# Patient Record
Sex: FEMALE | Race: WHITE | Employment: FULL TIME | URBAN - METROPOLITAN AREA
[De-identification: names, ages, dates, MRNs, and addresses within clinical notes are randomized per-mention and may not be internally consistent; named-entity substitution may affect disease eponyms.]

---

## 2017-06-21 ENCOUNTER — HOSPITAL ENCOUNTER (OUTPATIENT)
Dept: MAMMOGRAPHY | Age: 52
Discharge: HOME | End: 2017-06-21
Attending: FAMILY MEDICINE
Payer: COMMERCIAL

## 2017-06-21 DIAGNOSIS — Z12.31 VISIT FOR SCREENING MAMMOGRAM: ICD-10-CM

## 2017-06-21 PROCEDURE — 77067 SCR MAMMO BI INCL CAD: CPT

## 2018-01-02 ENCOUNTER — HOSPITAL ENCOUNTER (OUTPATIENT)
Dept: CT IMAGING | Age: 53
Discharge: HOME | End: 2018-01-02
Attending: FAMILY MEDICINE
Payer: COMMERCIAL

## 2018-01-02 DIAGNOSIS — J44.9 CHRONIC OBSTRUCTIVE PULMONARY DISEASE, UNSPECIFIED COPD TYPE (HCC): ICD-10-CM

## 2018-01-02 DIAGNOSIS — R05.3 PERSISTENT COUGH: ICD-10-CM

## 2018-01-02 PROCEDURE — 71250 CT THORAX DX C-: CPT

## 2018-01-02 NOTE — PROGRESS NOTES
There is emphysema mostly of the upper lungs and some compression of the lung tissue called atelectasis in the main treatment for that is just take deep breaths regularly    A low dose screening chest CT in 1 year is a consideration and of course quit smoking because of the emphysema

## 2018-03-09 ENCOUNTER — HOSPITAL ENCOUNTER (OUTPATIENT)
Dept: GENERAL RADIOLOGY | Age: 53
Discharge: HOME | End: 2018-03-09
Payer: COMMERCIAL

## 2018-03-09 DIAGNOSIS — M54.2 NECK PAIN: ICD-10-CM

## 2018-03-09 PROCEDURE — 72050 X-RAY EXAM NECK SPINE 4/5VWS: CPT

## 2018-03-23 ENCOUNTER — HOSPITAL ENCOUNTER (OUTPATIENT)
Dept: PHYSICAL THERAPY | Age: 53
Discharge: HOME | End: 2018-03-23
Payer: COMMERCIAL

## 2018-03-23 PROCEDURE — 97161 PT EVAL LOW COMPLEX 20 MIN: CPT

## 2018-03-23 PROCEDURE — 97140 MANUAL THERAPY 1/> REGIONS: CPT

## 2018-03-23 NOTE — THERAPY EVALUATION
David Riojas  : 1965  Primary: [employer]  Secondary:  [id number] at [facility]  [address] [facility], [address]  [phone]   [phone]         OUTPATIENT PHYSICAL THERAPY:Initial Assessment 3/23/2018    ICD-10: Treatment Diagnosis: Cervicalgia (M54.2)  Precautions/Allergies: Other food; Abilify [aripiprazole]; Augmentin [amoxicillin-pot clavulanate]; Fioricet [butalbital-acetaminophen-caff]; Levaquin [levofloxacin]; Lithium; Prednisone; Risperidone; Serzone [nefazodone]; Wellbutrin [bupropion hcl]; and Zoloft [sertraline]   Fall Risk Score: 0 (? 5 = High Risk)  MD Orders: evaluate and treat MEDICAL/REFERRING DIAGNOSIS:  Neck pain [M54.2]   DATE OF ONSET: 3 weeks ago  REFERRING PHYSICIAN: Mariya Maxwell MD  RETURN PHYSICIAN APPOINTMENT: 2018     ASSESSMENT (Date: 3/23/18): Ms. Nhi Hernandez presents to physical therapy with pain in the R side of her neck x 3 weeks. She demonstrated decreased ROM, decreased facet mobility, increased tissue tightness, and poor posture. She would benefit from skilled PT to decrease her pain and increase her mobility in order to decrease future movement related issues. PROBLEM LIST (Impacting functional limitations):  1. Decreased Strength  2. Decreased ADL/Functional Activities  3. Increased Pain  4. Decreased Activity Tolerance  5. Decreased Flexibility/Joint Mobility  6. Decreased [place] with Home Exercise Program INTERVENTIONS PLANNED:  1. Cold  2. Electrical Stimulation  3. Heat  4. Home Exercise Program (HEP)  5. Manual Therapy  6. Range of Motion (ROM)  7. Therapeutic Activites  8. Therapeutic Exercise/Strengthening   TREATMENT PLAN:  Effective Dates: 3/23/2018 TO 2018 (90 days). Frequency/Duration: 2 times a week for 90 Days  GOALS: (Goals have been discussed and agreed upon with patient.)   Discharge Goals: Time Frame: 90 days   1.  Patient will be independent with HEP.  2. Patient will have a decrease on the NDI to 10 indicating improved functional mobility. 3. Patient will report a decrease in pain to 2/10 in order to complete ADLs without difficulty. 4. Patient will demonstrate proper ergonomics for working at her computer in order decrease risk of reinjury. Rehabilitation Potential For Stated Goals: Good  Regarding Juan Camilo's therapy, I certify that the treatment plan above will be carried out by a therapist or under their direction. Thank you for this referral,  ARCHIE DueñasT, NCS     Referring Physician Signature: Keenan Diaz MD              Date                    HISTORY:   Patient Stated Goal:        To get rid of my pain  History of Present Injury/Illness (Reason for Referral):  Patient reports pain in her neck on the R. They did an x-ray and it showed arthritis. It feels like it keeps getting worse. She reports putting heat and ice on it. She works at a computer all day. She also has fibromyalgia. She has taken some muscle relaxers but it makes her over sleep. Sleeps on her L side, 1 pillow. Pain med help. Working makes her pain worse. Past Medical History/Comorbidities:   Ms. Eri Carreon  has a past medical history of Actinic keratitis; Allergic rhinitis (7/21/2016); Anxiety (7/21/2016); Arthralgia; Bacterial infection of skin (7/21/2016); Benign essential HTN (7/21/2016); Breast lump (6/3/13); Bronchitis; CAD (coronary artery disease) (7/21/2016); Cellulitis of left eyelid; Chest pain; Chronic pain (7/21/2016); Chronic pruritus (7/21/2016); COPD; COPD (chronic obstructive pulmonary disease) (HCC) (7/21/2016); CRP elevated; Decreased hearing, left (7/21/2016); Depressive disorder (7/21/2016); Diabetes ([facility] [address] ([id number]); Diabetes mellitus, type 2 ([facility] [address] (7/21/2016); Diarrhea; Elevated LFTs; Excessive sleepiness (7/21/2016);  Fatigue; Fibromyalgia; Gallstones; Ganglion cyst; Gastroenteritis; Gastrointestinal disorder; GERD (gastroesophageal reflux disease); Hand pain; Hidradenitis; Hot flashes; Hyperlipidemia (7/21/2016); Hypersomnolence (7/18/2016); Malaise and fatigue (7/21/2016); Menopause (7/21/2016); Nausea; Neurological disorder; Obesity (7/21/2016); KATHY (obstructive sleep apnea) (7/18/2016); Osteopenia (7/21/2016); Otitis externa, chronic (7/21/2016); Otitis media; Psychiatric disorder; Restless legs (7/18/2016); RLS (restless legs syndrome) (7/21/2016); Sinusitis; SOB (shortness of breath); Strain of thoracic region; Tension type headache; Tobacco user (7/18/2016); and Vitamin D deficiency. Ms. Eri Carreon  has a past surgical history that includes hx orthopaedic; hx heent; hx gyn; hx orthopaedic (2015); hx cholecystectomy; hx tonsillectomy; hx gyn; and hx gyn (1987). Social History/Living Environment:     lives with . Driving, independent with ADLs  Prior Level of Function/Work/Activity:  Works at a computer all day. Dominant Side:         RIGHT  Current Medications:    Current Outpatient Prescriptions:     carisoprodol (SOMA) 350 mg tablet, Take 0.5-1 Tabs by mouth nightly as needed for Muscle Spasm(s). Max Daily Amount: 350 mg., Disp: 30 Tab, Rfl: 0    colesevelam (WELCHOL) 625 mg tablet, 6 tablets at lunch, Disp: 180 Tab, Rfl: 11    sertraline (ZOLOFT) 50 mg tablet, Take 2 Tabs by mouth daily. Two tablets daily  Indications: ANXIETY WITH DEPRESSION, Disp: 180 Tab, Rfl: 3    lisinopril (PRINIVIL, ZESTRIL) 5 mg tablet, Take 1 Tab by mouth daily. To lower blood pressure, Disp: 90 Tab, Rfl: 3    estradiol (ESTRACE) 2 mg tablet, Take 1 Tab by mouth daily. Indications: VASOMOTOR SYMPTOMS ASSOCIATED WITH MENOPAUSE, Disp: 90 Tab, Rfl: 3    ezetimibe-simvastatin (VYTORIN) 10-20 mg per tablet, Take 1 Tab by mouth daily. To lower cholesterol, Disp: 90 Tab, Rfl: 3    pioglitazone (ACTOS) 30 mg tablet, Take 1 Tab by mouth daily.  Indications: type 2 diabetes mellitus, am, Disp: 90 Tab, Rfl: 3    omega-3 acid ethyl esters (LOVAZA) 1 gram capsule, Take 4 Caps by mouth daily (with breakfast). , Disp: 360 Cap, Rfl: 3    oxyCODONE-acetaminophen (PERCOCET 10)  mg per tablet, Take 1 Tab by mouth every six (6) hours as needed for Pain. Max Daily Amount: 4 Tabs. Indications: Pain, Disp: 120 Tab, Rfl: 0    azithromycin (ZITHROMAX) 250 mg tablet, Take two(2) the first day,  Then one per day for 4 more days. , Disp: 6 Tab, Rfl: 0    omeprazole (PRILOSEC) 40 mg capsule, Take 1 Cap by mouth daily. , Disp: 90 Cap, Rfl: 3    gentamicin (GARAMYCIN) 0.3 % ophthalmic solution, Administer 2 Drops to both eyes every four (4) hours. , Disp: 5 mL, Rfl: 0    albuterol (PROVENTIL VENTOLIN) 2.5 mg /3 mL (0.083 %) nebulizer solution, USE 1 VIAL VIA NEBULIZER EVERY 6 HOURS AS NEEDED FOR WHEEZING, Disp: , Rfl: 0    promethazine-codeine (PHENERGAN WITH CODEINE) 6.25-10 mg/5 mL syrup, Take 5 mL by mouth four (4) times daily as needed for Cough. Max Daily Amount: 20 mL., Disp: 240 mL, Rfl: 0    albuterol (PROVENTIL HFA, VENTOLIN HFA, PROAIR HFA) 90 mcg/actuation inhaler, Take 2 Puffs by inhalation every four (4) hours. , Disp: 1 Inhaler, Rfl: 0    ALPRAZolam (XANAX) 0.5 mg tablet, Take 0.5-1 Tabs by mouth three (3) times daily as needed for Anxiety. Max Daily Amount: 1.5 mg., Disp: 60 Tab, Rfl: 2    Armodafinil (NUVIGIL) 150 mg tab, 1.5 tablets daily  Indications: SLEEPINESS DUE TO OBSTRUCTIVE SLEEP APNEA, Disp: 45 Tab, Rfl: 5    diphenoxylate-atropine (LOMOTIL) 2.5-0.025 mg per tablet, Take 1-2 Tabs by mouth four (4) times daily as needed for Diarrhea. Max Daily Amount: 8 Tabs., Disp: 60 Tab, Rfl: 0    metoclopramide HCl (REGLAN) 10 mg tablet, Take 0.5-1 Tabs by mouth Before breakfast, lunch, dinner and at bedtime for 30 days.  For nausea, bloating and belching, Disp: 120 Tab, Rfl: 2    resorcinol-sulfur 2-8 % topical cream, Apply  to affected area two (2) times a day., Disp: 1 Tube, Rfl: 11    levocetirizine (XYZAL) 5 mg tablet, Take 1 Tab by mouth daily., Disp: 90 Tab, Rfl: 3    doxycycline (VIBRAMYCIN) 100 mg capsule, Take 1 Cap by mouth two (2) times a day., Disp: 60 Cap, Rfl: 5    DULoxetine (CYMBALTA) 30 mg capsule, Take 1 Cap by mouth daily. Indications: ANXIETY WITH DEPRESSION, Disp: 90 Cap, Rfl: 3    cholecalciferol, vitamin D3, 2,000 unit tab, Take 2,000 Units by mouth daily. , Disp: , Rfl:     clotrimazole-betamethasone (LOTRISONE) topical cream, Apply  to affected area three (3) times daily. , Disp: , Rfl:     aspirin 81 mg tablet, Take 81 mg by mouth. Last dose 7/15/12  Indications: am, Disp: , Rfl:      Date Last Reviewed:  3/23/2018   Number of Personal Factors/Comorbidities that affect the Plan of Care: 1-2: MODERATE COMPLEXITY   EXAMINATION:   Observation/Orthostatic Postural Assessment:          Rounded shoulders, forward head   Mental Status:          WFL  Palpation:          Decreased movement R facet joints C5-T4. Patient with palpation to occiput, R first rib mobilization, scalenes   Sensation:         WFL  Skin Integrity:          intact  Vision:          WFL  Balance:          Functional     R cervical rotation 80% with pain  L cervical rotation 100% with soreness  L sidebend 10% with pain  R sidebend 70% no pain   Extension full ROM with pain  Flexion no pain full ROM    Upper Extremity:         Strength grossly 4+/5  Functional Mobility:         Gait/Ambulation:  No significant deficits         Transfers:  independent        Bed Mobility:  Pain with mobility in neck and low back. independent        Stairs:  NT        Wheelchair:  NT              Body Structures Involved:  1. Nerves  2. Bones  3. Joints  4. Muscles  5. Ligaments Body Functions Affected:  1. Sensory/Pain  2. Neuromusculoskeletal  3. Movement Related Activities and Participation Affected:  1. Mobility  2. Domestic Life  3. Interpersonal Interactions and Relationships  4.  Community, Social and [place]   Number of elements that affect the Plan of Care: 4+: HIGH COMPLEXITY   CLINICAL PRESENTATION:   Presentation: Stable and uncomplicated: LOW COMPLEXITY   CLINICAL DECISION MAKING:   Outcome Measure: Tool Used: Neck Disability Index (NDI)  Score:  Initial: 22/50  Most Recent: X/50 (Date: -- )   Interpretation of Score: The Neck Disability Index is a revised form of the Oswestry Low Back Pain Index and is designed to measure the activities of daily living in person's with neck pain. Each section is scored on a 0-5 scale, 5 representing the greatest disability. The scores of each section are added together for a total score of 50. Medical Necessity:   · Patient is expected to demonstrate progress in range of motion and functional technique to decrease pain and compensatory movements with ADLs and IADL. · Patient demonstrates good rehab potential due to higher previous functional level. Reason for Services/Other Comments:  · Patient continues to require modification of therapeutic interventions to increase complexity of exercises. Use of outcome tool(s) and clinical judgement create a POC that gives a: Questionable prediction of patient's progress: MODERATE COMPLEXITY   TREATMENT:   (In addition to Assessment/Re-Assessment sessions the following treatments were rendered)  Pre Treatment Symptoms: see above    Manual therapy: 10 minutes. Patient supine with wedge, soft tissue mobilization to R scalenes, upper trap, levator to decrease pain and improve tissue mobility. In sitting, AP joint mobs grade 2 to C5-T4 to improve spinal mobility and decrease pain. Ice x 8 minutes post treatment to decrease pain and soreness    Treatment/Session Assessment:  Patient reported a decrease in pain post treatment. Small increase in cervical mobility. · Pain/ Symptoms: Initial:   6/10 Post Session:  4/10 ·   Compliance with Program/Exercises: Will assess as treatment progresses. · Recommendations/Intent for next treatment session:  \"Next visit will focus on advancements to more challenging activities and reduction in assistance provided\".   Total Treatment Duration:  PT Patient Time In/Time Out  Time In: 1440  Time Out: [address], DPT

## 2018-03-26 ENCOUNTER — APPOINTMENT (OUTPATIENT)
Dept: PHYSICAL THERAPY | Age: 53
End: 2018-03-26
Payer: COMMERCIAL

## 2018-03-26 NOTE — PROGRESS NOTES
Ambulatory/Rehab Services H2 Model Falls Risk Assessment    Risk Factor Pts. ·   Confusion/Disorientation/Impulsivity  []    4 ·   Symptomatic Depression  []   2 ·   Altered Elimination  []   1 ·   Dizziness/Vertigo  []   1 ·   Gender (Male)  []   1 ·   Any administered antiepileptics (anticonvulsants):  []   2 ·   Any administered benzodiazepines:  []   1 ·   Visual Impairment (specify):  []   1 ·   Portable Oxygen Use  []   1 ·   Orthostatic ? BP  []   1 ·   History of Recent Falls (within 3 mos.)  []   5     Ability to Rise from Chair (choose one) Pts. ·   Ability to rise in a single movement  [x]   0 ·   Pushes up, successful in one attempt  []   1 ·   Multiple attempts, but successful  []   3 ·   Unable to rise without assistance  []   4   Total: (5 or greater = High Risk) 0     Falls Prevention Plan:   []                Physical Limitations to Exercise (specify):   []                Mobility Assistance Device (type):   []                Exercise/Equipment Adaptation (specify):    ©2010 [facility] [address] Patent #[id number].  Federal Law prohibits the replication, distribution or use without written permission from [facility]

## 2018-03-27 ENCOUNTER — HOSPITAL ENCOUNTER (OUTPATIENT)
Dept: PHYSICAL THERAPY | Age: 53
Discharge: HOME | End: 2018-03-27
Payer: COMMERCIAL

## 2018-03-27 PROCEDURE — 97140 MANUAL THERAPY 1/> REGIONS: CPT

## 2018-03-27 NOTE — PROGRESS NOTES
Rosey Diaz  : 1965  Primary: [facility]  Secondary:  [address] at [facility]  [address], [address] [place] [address], [address]  [phone]   [phone]         OUTPATIENT PHYSICAL THERAPY:Daily Note 3/27/2018    ICD-10: Treatment Diagnosis: Cervicalgia (M54.2)  Precautions/Allergies: Other food; Abilify [aripiprazole]; Augmentin [amoxicillin-pot clavulanate]; Fioricet [butalbital-acetaminophen-caff]; Levaquin [levofloxacin]; Lithium; Prednisone; Risperidone; Serzone [nefazodone]; Wellbutrin [bupropion hcl]; and Zoloft [sertraline]   Fall Risk Score: 0 (? 5 = High Risk)  MD Orders: evaluate and treat MEDICAL/REFERRING DIAGNOSIS:  Neck pain [M54.2]   DATE OF ONSET: 3 weeks ago  REFERRING PHYSICIAN: Hernandez Conklin MD  RETURN PHYSICIAN APPOINTMENT: 2018     ASSESSMENT (Date: 3/23/18): Ms. Kwame Grove presents to physical therapy with pain in the R side of her neck x 3 weeks. She demonstrated decreased ROM, decreased facet mobility, increased tissue tightness, and poor posture. She would benefit from skilled PT to decrease her pain and increase her mobility in order to decrease future movement related issues. PROBLEM LIST (Impacting functional limitations):  1. Decreased Strength  2. Decreased ADL/Functional Activities  3. Increased Pain  4. Decreased Activity Tolerance  5. Decreased Flexibility/Joint Mobility  6. Decreased [place] with Home Exercise Program INTERVENTIONS PLANNED:  1. Cold  2. Electrical Stimulation  3. Heat  4. Home Exercise Program (HEP)  5. Manual Therapy  6. Range of Motion (ROM)  7. Therapeutic Activites  8. Therapeutic Exercise/Strengthening   TREATMENT PLAN:  Effective Dates: 3/23/2018 TO 2018 (90 days). Frequency/Duration: 2 times a week for 90 Days  GOALS: (Goals have been discussed and agreed upon with patient.)   Discharge Goals: Time Frame: 90 days   1. Patient will be independent with HEP.   2. Patient will have a decrease on the NDI to 10 indicating improved functional mobility. 3. Patient will report a decrease in pain to 2/10 in order to complete ADLs without difficulty. 4. Patient will demonstrate proper ergonomics for working at her computer in order decrease risk of reinjury. Rehabilitation Potential For Stated Goals: Good  Regarding Kalyani Camilo's therapy, I certify that the treatment plan above will be carried out by a therapist or under their direction. Thank you for this referral,  Kaleb Grey PTA, NCS     Referring Physician Signature: Mohit Watts MD              Date                    HISTORY:   Patient Stated Goal:        To get rid of my pain  History of Present Injury/Illness (Reason for Referral):  Patient reports pain in her neck on the R. They did an x-ray and it showed arthritis. It feels like it keeps getting worse. She reports putting heat and ice on it. She works at a computer all day. She also has fibromyalgia. She has taken some muscle relaxers but it makes her over sleep. Sleeps on her L side, 1 pillow. Pain med help. Working makes her pain worse. Past Medical History/Comorbidities:   Ms. Jourdan Myrick  has a past medical history of Actinic keratitis; Allergic rhinitis (7/21/2016); Anxiety (7/21/2016); Arthralgia; Bacterial infection of skin (7/21/2016); Benign essential HTN (7/21/2016); Breast lump (6/3/13); Bronchitis; CAD (coronary artery disease) (7/21/2016); Cellulitis of left eyelid; Chest pain; Chronic pain (7/21/2016); Chronic pruritus (7/21/2016); COPD; COPD (chronic obstructive pulmonary disease) (HCC) (7/21/2016); CRP elevated; Decreased hearing, left (7/21/2016); Depressive disorder (7/21/2016); Diabetes ([facility] [address] ([id number]); Diabetes mellitus, type 2 ([facility] [address] (7/21/2016); Diarrhea; Elevated LFTs; Excessive sleepiness (7/21/2016);  Fatigue; Fibromyalgia; Gallstones; Ganglion cyst; Gastroenteritis; Gastrointestinal disorder; GERD (gastroesophageal reflux disease); Hand pain; Hidradenitis; Hot flashes; Hyperlipidemia (7/21/2016); Hypersomnolence (7/18/2016); Malaise and fatigue (7/21/2016); Menopause (7/21/2016); Nausea; Neurological disorder; Obesity (7/21/2016); KATHY (obstructive sleep apnea) (7/18/2016); Osteopenia (7/21/2016); Otitis externa, chronic (7/21/2016); Otitis media; Psychiatric disorder; Restless legs (7/18/2016); RLS (restless legs syndrome) (7/21/2016); Sinusitis; SOB (shortness of breath); Strain of thoracic region; Tension type headache; Tobacco user (7/18/2016); and Vitamin D deficiency. Ms. Kierra Theodore  has a past surgical history that includes hx orthopaedic; hx heent; hx gyn; hx orthopaedic (2015); hx cholecystectomy; hx tonsillectomy; hx gyn; and hx gyn (1987). Social History/Living Environment:     lives with . Driving, independent with ADLs  Prior Level of Function/Work/Activity:  Works at a computer all day. Dominant Side:         RIGHT  Current Medications:    Current Outpatient Prescriptions:     carisoprodol (SOMA) 350 mg tablet, Take 0.5-1 Tabs by mouth nightly as needed for Muscle Spasm(s). Max Daily Amount: 350 mg., Disp: 30 Tab, Rfl: 0    colesevelam (WELCHOL) 625 mg tablet, 6 tablets at lunch, Disp: 180 Tab, Rfl: 11    sertraline (ZOLOFT) 50 mg tablet, Take 2 Tabs by mouth daily. Two tablets daily  Indications: ANXIETY WITH DEPRESSION, Disp: 180 Tab, Rfl: 3    lisinopril (PRINIVIL, ZESTRIL) 5 mg tablet, Take 1 Tab by mouth daily. To lower blood pressure, Disp: 90 Tab, Rfl: 3    estradiol (ESTRACE) 2 mg tablet, Take 1 Tab by mouth daily. Indications: VASOMOTOR SYMPTOMS ASSOCIATED WITH MENOPAUSE, Disp: 90 Tab, Rfl: 3    ezetimibe-simvastatin (VYTORIN) 10-20 mg per tablet, Take 1 Tab by mouth daily. To lower cholesterol, Disp: 90 Tab, Rfl: 3    pioglitazone (ACTOS) 30 mg tablet, Take 1 Tab by mouth daily.  Indications: type 2 diabetes mellitus, am, Disp: 90 Tab, Rfl: 3    omega-3 acid ethyl esters (LOVAZA) 1 gram capsule, Take 4 Caps by mouth daily (with breakfast). , Disp: 360 Cap, Rfl: 3    oxyCODONE-acetaminophen (PERCOCET 10)  mg per tablet, Take 1 Tab by mouth every six (6) hours as needed for Pain. Max Daily Amount: 4 Tabs. Indications: Pain, Disp: 120 Tab, Rfl: 0    azithromycin (ZITHROMAX) 250 mg tablet, Take two(2) the first day,  Then one per day for 4 more days. , Disp: 6 Tab, Rfl: 0    omeprazole (PRILOSEC) 40 mg capsule, Take 1 Cap by mouth daily. , Disp: 90 Cap, Rfl: 3    gentamicin (GARAMYCIN) 0.3 % ophthalmic solution, Administer 2 Drops to both eyes every four (4) hours. , Disp: 5 mL, Rfl: 0    albuterol (PROVENTIL VENTOLIN) 2.5 mg /3 mL (0.083 %) nebulizer solution, USE 1 VIAL VIA NEBULIZER EVERY 6 HOURS AS NEEDED FOR WHEEZING, Disp: , Rfl: 0    promethazine-codeine (PHENERGAN WITH CODEINE) 6.25-10 mg/5 mL syrup, Take 5 mL by mouth four (4) times daily as needed for Cough. Max Daily Amount: 20 mL., Disp: 240 mL, Rfl: 0    albuterol (PROVENTIL HFA, VENTOLIN HFA, PROAIR HFA) 90 mcg/actuation inhaler, Take 2 Puffs by inhalation every four (4) hours. , Disp: 1 Inhaler, Rfl: 0    ALPRAZolam (XANAX) 0.5 mg tablet, Take 0.5-1 Tabs by mouth three (3) times daily as needed for Anxiety. Max Daily Amount: 1.5 mg., Disp: 60 Tab, Rfl: 2    Armodafinil (NUVIGIL) 150 mg tab, 1.5 tablets daily  Indications: SLEEPINESS DUE TO OBSTRUCTIVE SLEEP APNEA, Disp: 45 Tab, Rfl: 5    diphenoxylate-atropine (LOMOTIL) 2.5-0.025 mg per tablet, Take 1-2 Tabs by mouth four (4) times daily as needed for Diarrhea. Max Daily Amount: 8 Tabs., Disp: 60 Tab, Rfl: 0    metoclopramide HCl (REGLAN) 10 mg tablet, Take 0.5-1 Tabs by mouth Before breakfast, lunch, dinner and at bedtime for 30 days. For nausea, bloating and belching, Disp: 120 Tab, Rfl: 2    resorcinol-sulfur 2-8 % topical cream, Apply  to affected area two (2) times a day., Disp: 1 Tube, Rfl: 11    levocetirizine (XYZAL) 5 mg tablet, Take 1 Tab by mouth daily. , Disp: 90 Tab, Rfl: 3   doxycycline (VIBRAMYCIN) 100 mg capsule, Take 1 Cap by mouth two (2) times a day., Disp: 60 Cap, Rfl: 5    DULoxetine (CYMBALTA) 30 mg capsule, Take 1 Cap by mouth daily. Indications: ANXIETY WITH DEPRESSION, Disp: 90 Cap, Rfl: 3    cholecalciferol, vitamin D3, 2,000 unit tab, Take 2,000 Units by mouth daily. , Disp: , Rfl:     clotrimazole-betamethasone (LOTRISONE) topical cream, Apply  to affected area three (3) times daily. , Disp: , Rfl:     aspirin 81 mg tablet, Take 81 mg by mouth. Last dose 7/15/12  Indications: am, Disp: , Rfl:      Date Last Reviewed:  3/27/2018   Number of Personal Factors/Comorbidities that affect the Plan of Care: 1-2: MODERATE COMPLEXITY   EXAMINATION:   Observation/Orthostatic Postural Assessment:          Rounded shoulders, forward head   Mental Status:          WFL  Palpation:          Decreased movement R facet joints C5-T4. Patient with palpation to occiput, R first rib mobilization, scalenes   Sensation:         WFL  Skin Integrity:          intact  Vision:          WFL  Balance:          Functional     R cervical rotation 80% with pain  L cervical rotation 100% with soreness  L sidebend 10% with pain  R sidebend 70% no pain   Extension full ROM with pain  Flexion no pain full ROM    Upper Extremity:         Strength grossly 4+/5  Functional Mobility:         Gait/Ambulation:  No significant deficits         Transfers:  independent        Bed Mobility:  Pain with mobility in neck and low back. independent        Stairs:  NT        Wheelchair:  NT              Body Structures Involved:  1. Nerves  2. Bones  3. Joints  4. Muscles  5. Ligaments Body Functions Affected:  1. Sensory/Pain  2. Neuromusculoskeletal  3. Movement Related Activities and Participation Affected:  1. Mobility  2. Domestic Life  3. Interpersonal Interactions and Relationships  4.  Community, Social and [place]   Number of elements that affect the Plan of Care: 4+: HIGH COMPLEXITY   CLINICAL PRESENTATION: Presentation: Stable and uncomplicated: LOW COMPLEXITY   CLINICAL DECISION MAKING:   Outcome Measure: Tool Used: Neck Disability Index (NDI)  Score:  Initial: 22/50  Most Recent: X/50 (Date: -- )   Interpretation of Score: The Neck Disability Index is a revised form of the Oswestry Low Back Pain Index and is designed to measure the activities of daily living in person's with neck pain. Each section is scored on a 0-5 scale, 5 representing the greatest disability. The scores of each section are added together for a total score of 50. Medical Necessity:   · Patient is expected to demonstrate progress in range of motion and functional technique to decrease pain and compensatory movements with ADLs and IADL. · Patient demonstrates good rehab potential due to higher previous functional level. Reason for Services/Other Comments:  · Patient continues to require modification of therapeutic interventions to increase complexity of exercises. Use of outcome tool(s) and clinical judgement create a POC that gives a: Questionable prediction of patient's progress: MODERATE COMPLEXITY   TREATMENT:   (In addition to Assessment/Re-Assessment sessions the following treatments were rendered)  Pre Treatment Symptoms: Patient reports pain level ius 4/10 today after taking Oxycodone. Prior to pain medication pain level was 8/10. Patient states she uses heat at home to help control pain. Patient reports she sits at computer working all day. Mother is going to Chemo treatments and she is taking her and staying with her some right now. Modalities: (20 minutes total) patient supine with knee wedge in place for moist heat to neck and back for 10 minutes prior to manual to help decrease tightness and pain. Following manual ice pack to neck with patient supine and knee wedge in place to help decrease pain.        Manual therapy:   (25 minutes) patient sitting for STM to bilateral upper traps, upper back and neck to help decrease pain and tightness     Therapeutic Exercise: ( 0):  Exercises per grid below to improve mobility and strength. Required minimal visual and verbal cues to promote proper body alignment, promote proper body posture and promote proper body breathing techniques. Progressed resistance, range, repetitions and complexity of movement as indicated. Date:   Date:   Date:     Activity/Exercise Parameters Parameters Parameters                                                 Blood pressure in supine following treatment: 135/88 and heart rate 101bpm Patient states her heart rate stays high most of the time. Treatment/Session Assessment:  Patient reported a decreased pain and slightly improved mobility. Continue per plan of care. · Pain/ Symptoms: Initial:   4/10 after pain medication earlier Post Session: 2-3 /10 ·   Compliance with Program/Exercises: Will assess as treatment progresses. · Recommendations/Intent for next treatment session: \"Next visit will focus on advancements to more challenging activities and reduction in assistance provided\".   Total Treatment Duration:  PT Patient Time In/Time Out  Time In: 1345  Time Out: Hi Hoffman, PTA

## 2018-03-29 ENCOUNTER — HOSPITAL ENCOUNTER (OUTPATIENT)
Dept: PHYSICAL THERAPY | Age: 53
Discharge: HOME | End: 2018-03-29
Payer: COMMERCIAL

## 2018-03-29 PROCEDURE — 97140 MANUAL THERAPY 1/> REGIONS: CPT

## 2018-03-29 NOTE — PROGRESS NOTES
Cameron Vital  : 1965  Primary: Nba Lincolnakin Montgomery  Secondary:  Sonia Coulter at [facility] [address], [address] [facility], [address]  [phone]   [phone]         OUTPATIENT PHYSICAL THERAPY:Daily Note 3/29/2018    ICD-10: Treatment Diagnosis: Cervicalgia (M54.2)  Precautions/Allergies: Other food; Abilify [aripiprazole]; Augmentin [amoxicillin-pot clavulanate]; Fioricet [butalbital-acetaminophen-caff]; Levaquin [levofloxacin]; Lithium; Prednisone; Risperidone; Serzone [nefazodone]; Wellbutrin [bupropion hcl]; and Zoloft [sertraline]   Fall Risk Score: 0 (? 5 = High Risk)  MD Orders: evaluate and treat MEDICAL/REFERRING DIAGNOSIS:  Neck pain [M54.2]   DATE OF ONSET: 3 weeks ago  REFERRING PHYSICIAN: Ellen Bearden MD  RETURN PHYSICIAN APPOINTMENT: 2018     ASSESSMENT (Date: 3/23/18): Ms. Murlean Blizzard presents to physical therapy with pain in the R side of her neck x 3 weeks. She demonstrated decreased ROM, decreased facet mobility, increased tissue tightness, and poor posture. She would benefit from skilled PT to decrease her pain and increase her mobility in order to decrease future movement related issues. PROBLEM LIST (Impacting functional limitations):  1. Decreased Strength  2. Decreased ADL/Functional Activities  3. Increased Pain  4. Decreased Activity Tolerance  5. Decreased Flexibility/Joint Mobility  6. Decreased [place] with Home Exercise Program INTERVENTIONS PLANNED:  1. Cold  2. Electrical Stimulation  3. Heat  4. Home Exercise Program (HEP)  5. Manual Therapy  6. Range of Motion (ROM)  7. Therapeutic Activites  8. Therapeutic Exercise/Strengthening   TREATMENT PLAN:  Effective Dates: 3/23/2018 TO 2018 (90 days). Frequency/Duration: 2 times a week for 90 Days  GOALS: (Goals have been discussed and agreed upon with patient.)   Discharge Goals: Time Frame: 90 days   1. Patient will be independent with HEP.   2. Patient will have a decrease on the NDI to 10 indicating improved functional mobility. 3. Patient will report a decrease in pain to 2/10 in order to complete ADLs without difficulty. 4. Patient will demonstrate proper ergonomics for working at her computer in order decrease risk of reinjury. Rehabilitation Potential For Stated Goals: Good  Regarding Shamir Camilo's therapy, I certify that the treatment plan above will be carried out by a therapist or under their direction. Thank you for this referral,  Corie Gonzalez PTA, NCS     Referring Physician Signature: Adolfo Zhang MD              Date                    HISTORY:   Patient Stated Goal:        To get rid of my pain  History of Present Injury/Illness (Reason for Referral):  Patient reports pain in her neck on the R. They did an x-ray and it showed arthritis. It feels like it keeps getting worse. She reports putting heat and ice on it. She works at a computer all day. She also has fibromyalgia. She has taken some muscle relaxers but it makes her over sleep. Sleeps on her L side, 1 pillow. Pain med help. Working makes her pain worse. Past Medical History/Comorbidities:   Ms. Eduardo Moore  has a past medical history of Actinic keratitis; Allergic rhinitis (7/21/2016); Anxiety (7/21/2016); Arthralgia; Bacterial infection of skin (7/21/2016); Benign essential HTN (7/21/2016); Breast lump (6/3/13); Bronchitis; CAD (coronary artery disease) (7/21/2016); Cellulitis of left eyelid; Chest pain; Chronic pain (7/21/2016); Chronic pruritus (7/21/2016); COPD; COPD (chronic obstructive pulmonary disease) (HCC) (7/21/2016); CRP elevated; Decreased hearing, left (7/21/2016); Depressive disorder (7/21/2016); Diabetes ([facility] [address] ([id number]); Diabetes mellitus, type 2 ([facility] [address] (7/21/2016); Diarrhea; Elevated LFTs; Excessive sleepiness (7/21/2016);  Fatigue; Fibromyalgia; Gallstones; Ganglion cyst; Gastroenteritis; Gastrointestinal disorder; GERD (gastroesophageal reflux disease); Hand pain; Hidradenitis; Hot flashes; Hyperlipidemia (7/21/2016); Hypersomnolence (7/18/2016); Malaise and fatigue (7/21/2016); Menopause (7/21/2016); Nausea; Neurological disorder; Obesity (7/21/2016); KATHY (obstructive sleep apnea) (7/18/2016); Osteopenia (7/21/2016); Otitis externa, chronic (7/21/2016); Otitis media; Psychiatric disorder; Restless legs (7/18/2016); RLS (restless legs syndrome) (7/21/2016); Sinusitis; SOB (shortness of breath); Strain of thoracic region; Tension type headache; Tobacco user (7/18/2016); and Vitamin D deficiency. Ms. Shelia Lawrence  has a past surgical history that includes hx orthopaedic; hx heent; hx gyn; hx orthopaedic (2015); hx cholecystectomy; hx tonsillectomy; hx gyn; and hx gyn (1987). Social History/Living Environment:     lives with . Driving, independent with ADLs  Prior Level of Function/Work/Activity:  Works at a computer all day. Dominant Side:         RIGHT  Current Medications:    Current Outpatient Prescriptions:     carisoprodol (SOMA) 350 mg tablet, Take 0.5-1 Tabs by mouth nightly as needed for Muscle Spasm(s). Max Daily Amount: 350 mg., Disp: 30 Tab, Rfl: 0    colesevelam (WELCHOL) 625 mg tablet, 6 tablets at lunch, Disp: 180 Tab, Rfl: 11    sertraline (ZOLOFT) 50 mg tablet, Take 2 Tabs by mouth daily. Two tablets daily  Indications: ANXIETY WITH DEPRESSION, Disp: 180 Tab, Rfl: 3    lisinopril (PRINIVIL, ZESTRIL) 5 mg tablet, Take 1 Tab by mouth daily. To lower blood pressure, Disp: 90 Tab, Rfl: 3    estradiol (ESTRACE) 2 mg tablet, Take 1 Tab by mouth daily. Indications: VASOMOTOR SYMPTOMS ASSOCIATED WITH MENOPAUSE, Disp: 90 Tab, Rfl: 3    ezetimibe-simvastatin (VYTORIN) 10-20 mg per tablet, Take 1 Tab by mouth daily. To lower cholesterol, Disp: 90 Tab, Rfl: 3    pioglitazone (ACTOS) 30 mg tablet, Take 1 Tab by mouth daily.  Indications: type 2 diabetes mellitus, am, Disp: 90 Tab, Rfl: 3    omega-3 acid ethyl esters (LOVAZA) 1 gram capsule, Take 4 Caps by mouth daily (with breakfast). , Disp: 360 Cap, Rfl: 3    oxyCODONE-acetaminophen (PERCOCET 10)  mg per tablet, Take 1 Tab by mouth every six (6) hours as needed for Pain. Max Daily Amount: 4 Tabs. Indications: Pain, Disp: 120 Tab, Rfl: 0    azithromycin (ZITHROMAX) 250 mg tablet, Take two(2) the first day,  Then one per day for 4 more days. , Disp: 6 Tab, Rfl: 0    omeprazole (PRILOSEC) 40 mg capsule, Take 1 Cap by mouth daily. , Disp: 90 Cap, Rfl: 3    gentamicin (GARAMYCIN) 0.3 % ophthalmic solution, Administer 2 Drops to both eyes every four (4) hours. , Disp: 5 mL, Rfl: 0    albuterol (PROVENTIL VENTOLIN) 2.5 mg /3 mL (0.083 %) nebulizer solution, USE 1 VIAL VIA NEBULIZER EVERY 6 HOURS AS NEEDED FOR WHEEZING, Disp: , Rfl: 0    promethazine-codeine (PHENERGAN WITH CODEINE) 6.25-10 mg/5 mL syrup, Take 5 mL by mouth four (4) times daily as needed for Cough. Max Daily Amount: 20 mL., Disp: 240 mL, Rfl: 0    albuterol (PROVENTIL HFA, VENTOLIN HFA, PROAIR HFA) 90 mcg/actuation inhaler, Take 2 Puffs by inhalation every four (4) hours. , Disp: 1 Inhaler, Rfl: 0    ALPRAZolam (XANAX) 0.5 mg tablet, Take 0.5-1 Tabs by mouth three (3) times daily as needed for Anxiety. Max Daily Amount: 1.5 mg., Disp: 60 Tab, Rfl: 2    Armodafinil (NUVIGIL) 150 mg tab, 1.5 tablets daily  Indications: SLEEPINESS DUE TO OBSTRUCTIVE SLEEP APNEA, Disp: 45 Tab, Rfl: 5    diphenoxylate-atropine (LOMOTIL) 2.5-0.025 mg per tablet, Take 1-2 Tabs by mouth four (4) times daily as needed for Diarrhea. Max Daily Amount: 8 Tabs., Disp: 60 Tab, Rfl: 0    metoclopramide HCl (REGLAN) 10 mg tablet, Take 0.5-1 Tabs by mouth Before breakfast, lunch, dinner and at bedtime for 30 days. For nausea, bloating and belching, Disp: 120 Tab, Rfl: 2    resorcinol-sulfur 2-8 % topical cream, Apply  to affected area two (2) times a day., Disp: 1 Tube, Rfl: 11    levocetirizine (XYZAL) 5 mg tablet, Take 1 Tab by mouth daily. , Disp: 90 Tab, Rfl: 3   doxycycline (VIBRAMYCIN) 100 mg capsule, Take 1 Cap by mouth two (2) times a day., Disp: 60 Cap, Rfl: 5    DULoxetine (CYMBALTA) 30 mg capsule, Take 1 Cap by mouth daily. Indications: ANXIETY WITH DEPRESSION, Disp: 90 Cap, Rfl: 3    cholecalciferol, vitamin D3, 2,000 unit tab, Take 2,000 Units by mouth daily. , Disp: , Rfl:     clotrimazole-betamethasone (LOTRISONE) topical cream, Apply  to affected area three (3) times daily. , Disp: , Rfl:     aspirin 81 mg tablet, Take 81 mg by mouth. Last dose 7/15/12  Indications: am, Disp: , Rfl:      Date Last Reviewed:  3/29/2018   Number of Personal Factors/Comorbidities that affect the Plan of Care: 1-2: MODERATE COMPLEXITY   EXAMINATION:   Observation/Orthostatic Postural Assessment:          Rounded shoulders, forward head   Mental Status:          WFL  Palpation:          Decreased movement R facet joints C5-T4. Patient with palpation to occiput, R first rib mobilization, scalenes   Sensation:         WFL  Skin Integrity:          intact  Vision:          WFL  Balance:          Functional     R cervical rotation 80% with pain  L cervical rotation 100% with soreness  L sidebend 10% with pain  R sidebend 70% no pain   Extension full ROM with pain  Flexion no pain full ROM    Upper Extremity:         Strength grossly 4+/5  Functional Mobility:         Gait/Ambulation:  No significant deficits         Transfers:  independent        Bed Mobility:  Pain with mobility in neck and low back. independent        Stairs:  NT        Wheelchair:  NT              Body Structures Involved:  1. Nerves  2. Bones  3. Joints  4. Muscles  5. Ligaments Body Functions Affected:  1. Sensory/Pain  2. Neuromusculoskeletal  3. Movement Related Activities and Participation Affected:  1. Mobility  2. Domestic Life  3. Interpersonal Interactions and Relationships  4.  Community, Social and [place]   Number of elements that affect the Plan of Care: 4+: HIGH COMPLEXITY   CLINICAL PRESENTATION: Presentation: Stable and uncomplicated: LOW COMPLEXITY   CLINICAL DECISION MAKING:   Outcome Measure: Tool Used: Neck Disability Index (NDI)  Score:  Initial: 22/50  Most Recent: X/50 (Date: -- )   Interpretation of Score: The Neck Disability Index is a revised form of the Oswestry Low Back Pain Index and is designed to measure the activities of daily living in person's with neck pain. Each section is scored on a 0-5 scale, 5 representing the greatest disability. The scores of each section are added together for a total score of 50. Medical Necessity:   · Patient is expected to demonstrate progress in range of motion and functional technique to decrease pain and compensatory movements with ADLs and IADL. · Patient demonstrates good rehab potential due to higher previous functional level. Reason for Services/Other Comments:  · Patient continues to require modification of therapeutic interventions to increase complexity of exercises. Use of outcome tool(s) and clinical judgement create a POC that gives a: Questionable prediction of patient's progress: MODERATE COMPLEXITY   TREATMENT:   (In addition to Assessment/Re-Assessment sessions the following treatments were rendered)  Pre Treatment Symptoms: Patient reports that she is about the same. Pain level 5/10 today in neck and upper back. Modalities: (20 minutes total) patient supine with knee wedge in place for moist heat to neck and back for 10 minutes prior to manual to help decrease tightness and pain. Following manual ice pack to neck with patient supine and knee wedge in place to help decrease pain. Manual therapy:   (25 minutes) patient supine for STM to bilateral upper traps, upper back and neck and then patient sitting for STM to right more than left upper traps, upper back, and neck to help decrease pain and tightness. Therapeutic Exercise: ( 5 minutes):  Exercises per grid below to improve mobility and strength.   Required minimal visual and verbal cues to promote proper body alignment, promote proper body posture and promote proper body breathing techniques. Progressed resistance, range, repetitions and complexity of movement as indicated. Date:  3/29/18 Date:   Date:     Activity/Exercise Parameters Parameters Parameters   Scapular retraction X 10 reps       Shoulder shrugs X 10 reps  With discomfort in right neck area                                         Treatment/Session Assessment:  Patient reported a decreased pain and slightly improved mobility. Patient continues to have a good deal of pain and states that all she is doing at work is probably not helping. Work on more posture exercises next visit as patient tolerates. Continue per plan of care. · Pain/ Symptoms: Initial:   5/10 Post Session: 3 /10 ·   Compliance with Program/Exercises: Will assess as treatment progresses. · Recommendations/Intent for next treatment session: \"Next visit will focus on advancements to more challenging activities and reduction in assistance provided\".   Total Treatment Duration:  PT Patient Time In/Time Out  Time In: 1500  Time Out: [address], PTA

## 2018-04-02 ENCOUNTER — HOSPITAL ENCOUNTER (OUTPATIENT)
Dept: PHYSICAL THERAPY | Age: 53
Discharge: HOME | End: 2018-04-02
Payer: COMMERCIAL

## 2018-04-02 PROCEDURE — 97140 MANUAL THERAPY 1/> REGIONS: CPT

## 2018-04-02 NOTE — PROGRESS NOTES
Alex Goodwin  : 1965  Primary: Jerald Montgomery  Secondary:  [id number]  at [facility]  [address], [facility] [place], [address]  [phone]   [phone]         OUTPATIENT PHYSICAL THERAPY:Daily Note 2018    ICD-10: Treatment Diagnosis: Cervicalgia (M54.2)  Precautions/Allergies: Other food; Abilify [aripiprazole]; Augmentin [amoxicillin-pot clavulanate]; Fioricet [butalbital-acetaminophen-caff]; Levaquin [levofloxacin]; Lithium; Prednisone; Risperidone; Serzone [nefazodone]; Wellbutrin [bupropion hcl]; and Zoloft [sertraline]   Fall Risk Score: 0 (? 5 = High Risk)  MD Orders: evaluate and treat MEDICAL/REFERRING DIAGNOSIS:  Neck pain [M54.2]   DATE OF ONSET: 3 weeks ago  REFERRING PHYSICIAN: James Paulino MD  RETURN PHYSICIAN APPOINTMENT: 2018     ASSESSMENT (Date: 3/23/18): Ms. Esau Lopez presents to physical therapy with pain in the R side of her neck x 3 weeks. She demonstrated decreased ROM, decreased facet mobility, increased tissue tightness, and poor posture. She would benefit from skilled PT to decrease her pain and increase her mobility in order to decrease future movement related issues. PROBLEM LIST (Impacting functional limitations):  1. Decreased Strength  2. Decreased ADL/Functional Activities  3. Increased Pain  4. Decreased Activity Tolerance  5. Decreased Flexibility/Joint Mobility  6. Decreased [place] with Home Exercise Program INTERVENTIONS PLANNED:  1. Cold  2. Electrical Stimulation  3. Heat  4. Home Exercise Program (HEP)  5. Manual Therapy  6. Range of Motion (ROM)  7. Therapeutic Activites  8. Therapeutic Exercise/Strengthening   TREATMENT PLAN:  Effective Dates: 3/23/2018 TO 2018 (90 days). Frequency/Duration: 2 times a week for 90 Days  GOALS: (Goals have been discussed and agreed upon with patient.)   Discharge Goals: Time Frame: 90 days   1. Patient will be independent with HEP.   2. Patient will have a decrease on the NDI to 10 indicating improved functional mobility. 3. Patient will report a decrease in pain to 2/10 in order to complete ADLs without difficulty. 4. Patient will demonstrate proper ergonomics for working at her computer in order decrease risk of reinjury. Rehabilitation Potential For Stated Goals: Good  Regarding Genevieve Camilo's therapy, I certify that the treatment plan above will be carried out by a therapist or under their direction. Thank you for this referral,  Elif Palomares PTA, NCS     Referring Physician Signature: Mundo Arango MD              Date                    HISTORY:   Patient Stated Goal:        To get rid of my pain  History of Present Injury/Illness (Reason for Referral):  Patient reports pain in her neck on the R. They did an x-ray and it showed arthritis. It feels like it keeps getting worse. She reports putting heat and ice on it. She works at a computer all day. She also has fibromyalgia. She has taken some muscle relaxers but it makes her over sleep. Sleeps on her L side, 1 pillow. Pain med help. Working makes her pain worse. Past Medical History/Comorbidities:   Ms. Virginia Alvarez  has a past medical history of Actinic keratitis; Allergic rhinitis (7/21/2016); Anxiety (7/21/2016); Arthralgia; Bacterial infection of skin (7/21/2016); Benign essential HTN (7/21/2016); Breast lump (6/3/13); Bronchitis; CAD (coronary artery disease) (7/21/2016); Cellulitis of left eyelid; Chest pain; Chronic pain (7/21/2016); Chronic pruritus (7/21/2016); COPD; COPD (chronic obstructive pulmonary disease) (HCC) (7/21/2016); CRP elevated; Decreased hearing, left (7/21/2016); Depressive disorder (7/21/2016); Diabetes ([facility] [address] ([id number]); Diabetes mellitus, type 2 ([facility] [address] (7/21/2016); Diarrhea; Elevated LFTs; Excessive sleepiness (7/21/2016);  Fatigue; Fibromyalgia; Gallstones; Ganglion cyst; Gastroenteritis; Gastrointestinal disorder; GERD (gastroesophageal reflux disease); Hand pain; Hidradenitis; Hot flashes; Hyperlipidemia (7/21/2016); Hypersomnolence (7/18/2016); Malaise and fatigue (7/21/2016); Menopause (7/21/2016); Nausea; Neurological disorder; Obesity (7/21/2016); KATHY (obstructive sleep apnea) (7/18/2016); Osteopenia (7/21/2016); Otitis externa, chronic (7/21/2016); Otitis media; Psychiatric disorder; Restless legs (7/18/2016); RLS (restless legs syndrome) (7/21/2016); Sinusitis; SOB (shortness of breath); Strain of thoracic region; Tension type headache; Tobacco user (7/18/2016); and Vitamin D deficiency. Ms. Kaley Bianchi  has a past surgical history that includes hx orthopaedic; hx heent; hx gyn; hx orthopaedic (2015); hx cholecystectomy; hx tonsillectomy; hx gyn; and hx gyn (1987). Social History/Living Environment:     lives with . Driving, independent with ADLs  Prior Level of Function/Work/Activity:  Works at a computer all day. Dominant Side:         RIGHT  Current Medications:    Current Outpatient Prescriptions:     carisoprodol (SOMA) 350 mg tablet, Take 0.5-1 Tabs by mouth nightly as needed for Muscle Spasm(s). Max Daily Amount: 350 mg., Disp: 30 Tab, Rfl: 0    colesevelam (WELCHOL) 625 mg tablet, 6 tablets at lunch, Disp: 180 Tab, Rfl: 11    sertraline (ZOLOFT) 50 mg tablet, Take 2 Tabs by mouth daily. Two tablets daily  Indications: ANXIETY WITH DEPRESSION, Disp: 180 Tab, Rfl: 3    lisinopril (PRINIVIL, ZESTRIL) 5 mg tablet, Take 1 Tab by mouth daily. To lower blood pressure, Disp: 90 Tab, Rfl: 3    estradiol (ESTRACE) 2 mg tablet, Take 1 Tab by mouth daily. Indications: VASOMOTOR SYMPTOMS ASSOCIATED WITH MENOPAUSE, Disp: 90 Tab, Rfl: 3    ezetimibe-simvastatin (VYTORIN) 10-20 mg per tablet, Take 1 Tab by mouth daily. To lower cholesterol, Disp: 90 Tab, Rfl: 3    pioglitazone (ACTOS) 30 mg tablet, Take 1 Tab by mouth daily.  Indications: type 2 diabetes mellitus, am, Disp: 90 Tab, Rfl: 3    omega-3 acid ethyl esters (LOVAZA) 1 gram capsule, Take 4 Caps by mouth daily (with breakfast). , Disp: 360 Cap, Rfl: 3    oxyCODONE-acetaminophen (PERCOCET 10)  mg per tablet, Take 1 Tab by mouth every six (6) hours as needed for Pain. Max Daily Amount: 4 Tabs. Indications: Pain, Disp: 120 Tab, Rfl: 0    azithromycin (ZITHROMAX) 250 mg tablet, Take two(2) the first day,  Then one per day for 4 more days. , Disp: 6 Tab, Rfl: 0    omeprazole (PRILOSEC) 40 mg capsule, Take 1 Cap by mouth daily. , Disp: 90 Cap, Rfl: 3    gentamicin (GARAMYCIN) 0.3 % ophthalmic solution, Administer 2 Drops to both eyes every four (4) hours. , Disp: 5 mL, Rfl: 0    albuterol (PROVENTIL VENTOLIN) 2.5 mg /3 mL (0.083 %) nebulizer solution, USE 1 VIAL VIA NEBULIZER EVERY 6 HOURS AS NEEDED FOR WHEEZING, Disp: , Rfl: 0    promethazine-codeine (PHENERGAN WITH CODEINE) 6.25-10 mg/5 mL syrup, Take 5 mL by mouth four (4) times daily as needed for Cough. Max Daily Amount: 20 mL., Disp: 240 mL, Rfl: 0    albuterol (PROVENTIL HFA, VENTOLIN HFA, PROAIR HFA) 90 mcg/actuation inhaler, Take 2 Puffs by inhalation every four (4) hours. , Disp: 1 Inhaler, Rfl: 0    ALPRAZolam (XANAX) 0.5 mg tablet, Take 0.5-1 Tabs by mouth three (3) times daily as needed for Anxiety. Max Daily Amount: 1.5 mg., Disp: 60 Tab, Rfl: 2    Armodafinil (NUVIGIL) 150 mg tab, 1.5 tablets daily  Indications: SLEEPINESS DUE TO OBSTRUCTIVE SLEEP APNEA, Disp: 45 Tab, Rfl: 5    diphenoxylate-atropine (LOMOTIL) 2.5-0.025 mg per tablet, Take 1-2 Tabs by mouth four (4) times daily as needed for Diarrhea. Max Daily Amount: 8 Tabs., Disp: 60 Tab, Rfl: 0    metoclopramide HCl (REGLAN) 10 mg tablet, Take 0.5-1 Tabs by mouth Before breakfast, lunch, dinner and at bedtime for 30 days. For nausea, bloating and belching, Disp: 120 Tab, Rfl: 2    resorcinol-sulfur 2-8 % topical cream, Apply  to affected area two (2) times a day., Disp: 1 Tube, Rfl: 11    levocetirizine (XYZAL) 5 mg tablet, Take 1 Tab by mouth daily. , Disp: 90 Tab, Rfl: 3   doxycycline (VIBRAMYCIN) 100 mg capsule, Take 1 Cap by mouth two (2) times a day., Disp: 60 Cap, Rfl: 5    DULoxetine (CYMBALTA) 30 mg capsule, Take 1 Cap by mouth daily. Indications: ANXIETY WITH DEPRESSION, Disp: 90 Cap, Rfl: 3    cholecalciferol, vitamin D3, 2,000 unit tab, Take 2,000 Units by mouth daily. , Disp: , Rfl:     clotrimazole-betamethasone (LOTRISONE) topical cream, Apply  to affected area three (3) times daily. , Disp: , Rfl:     aspirin 81 mg tablet, Take 81 mg by mouth. Last dose 7/15/12  Indications: am, Disp: , Rfl:      Date Last Reviewed:  4/2/2018   Number of Personal Factors/Comorbidities that affect the Plan of Care: 1-2: MODERATE COMPLEXITY   EXAMINATION:   Observation/Orthostatic Postural Assessment:          Rounded shoulders, forward head   Mental Status:          WFL  Palpation:          Decreased movement R facet joints C5-T4. Patient with palpation to occiput, R first rib mobilization, scalenes   Sensation:         WFL  Skin Integrity:          intact  Vision:          WFL  Balance:          Functional     R cervical rotation 80% with pain  L cervical rotation 100% with soreness  L sidebend 10% with pain  R sidebend 70% no pain   Extension full ROM with pain  Flexion no pain full ROM    Upper Extremity:         Strength grossly 4+/5  Functional Mobility:         Gait/Ambulation:  No significant deficits         Transfers:  independent        Bed Mobility:  Pain with mobility in neck and low back. independent        Stairs:  NT        Wheelchair:  NT              Body Structures Involved:  1. Nerves  2. Bones  3. Joints  4. Muscles  5. Ligaments Body Functions Affected:  1. Sensory/Pain  2. Neuromusculoskeletal  3. Movement Related Activities and Participation Affected:  1. Mobility  2. Domestic Life  3. Interpersonal Interactions and Relationships  4.  Community, Social and [place]   Number of elements that affect the Plan of Care: 4+: HIGH COMPLEXITY   CLINICAL PRESENTATION: Presentation: Stable and uncomplicated: LOW COMPLEXITY   CLINICAL DECISION MAKING:   Outcome Measure: Tool Used: Neck Disability Index (NDI)  Score:  Initial: 22/50  Most Recent: X/50 (Date: -- )   Interpretation of Score: The Neck Disability Index is a revised form of the Oswestry Low Back Pain Index and is designed to measure the activities of daily living in person's with neck pain. Each section is scored on a 0-5 scale, 5 representing the greatest disability. The scores of each section are added together for a total score of 50. Medical Necessity:   · Patient is expected to demonstrate progress in range of motion and functional technique to decrease pain and compensatory movements with ADLs and IADL. · Patient demonstrates good rehab potential due to higher previous functional level. Reason for Services/Other Comments:  · Patient continues to require modification of therapeutic interventions to increase complexity of exercises. Use of outcome tool(s) and clinical judgement create a POC that gives a: Questionable prediction of patient's progress: MODERATE COMPLEXITY   TREATMENT:   (In addition to Assessment/Re-Assessment sessions the following treatments were rendered)  Pre Treatment Symptoms: Patient reports that she feels like she is getting a little better but reports pain level is 5/10 today. Patient states that her fibromyalgia has been bothering her more lately and on Saturday she could hardly get out of bed. Patient has difficulty sleeping due to pain/discomfort. Patient states it is very difficulty working but she loves her job and also needs to work. Modalities: (20 minutes total) patient supine with knee wedge in place for moist heat to neck and back for 10 minutes prior to manual to help decrease tightness and pain. Following manual ice pack to neck with patient supine and knee wedge in place to help decrease pain.        Manual therapy:   (25 minutes) patient sitting for STM to bilateral upper traps, upper back and neck and then patient supine with knee wedge in place  for STM to right more than left upper traps, upper back, and neck to help decrease pain and tightness. Therapeutic Exercise: ( 0 minutes):  Exercises per grid below to improve mobility and strength. Required minimal visual and verbal cues to promote proper body alignment, promote proper body posture and promote proper body breathing techniques. Progressed resistance, range, repetitions and complexity of movement as indicated. Date:  3/29/18 Date:   Date:     Activity/Exercise Parameters Parameters Parameters   Scapular retraction X 10 reps       Shoulder shrugs X 10 reps  With discomfort in right neck area                                         Treatment/Session Assessment:  Patient reports she feels more loose following treatment today. Pain remains about the same. Continue per plan of care. · Pain/ Symptoms: Initial:   5/10 Post Session: no number given ·   Compliance with Program/Exercises: Will assess as treatment progresses. · Recommendations/Intent for next treatment session: \"Next visit will focus on advancements to more challenging activities and reduction in assistance provided\".   Total Treatment Duration:  PT Patient Time In/Time Out  Time In: 1000  Time Out: [address], [place]

## 2018-04-06 ENCOUNTER — HOSPITAL ENCOUNTER (OUTPATIENT)
Dept: PHYSICAL THERAPY | Age: 53
Discharge: HOME | End: 2018-04-06
Payer: COMMERCIAL

## 2018-04-06 PROCEDURE — 97140 MANUAL THERAPY 1/> REGIONS: CPT

## 2018-04-06 NOTE — PROGRESS NOTES
Afshan Dubon  : 1965  Primary: Angelo Montgomery  Secondary:  [address] at [facility]  [address] [facility], [address] [place] [address], [address]  [phone]   [phone]         OUTPATIENT PHYSICAL THERAPY:Daily Note 2018    ICD-10: Treatment Diagnosis: Cervicalgia (M54.2)  Precautions/Allergies: Other food; Abilify [aripiprazole]; Augmentin [amoxicillin-pot clavulanate]; Fioricet [butalbital-acetaminophen-caff]; Levaquin [levofloxacin]; Lithium; Prednisone; Risperidone; Serzone [nefazodone]; Wellbutrin [bupropion hcl]; and Zoloft [sertraline]   Fall Risk Score: 0 (? 5 = High Risk)  MD Orders: evaluate and treat MEDICAL/REFERRING DIAGNOSIS:  Neck pain [M54.2]   DATE OF ONSET: 3 weeks ago  REFERRING PHYSICIAN: Angle Stoddard MD  RETURN PHYSICIAN APPOINTMENT: 2018     ASSESSMENT (Date: 3/23/18): Ms. Dorota Solitario presents to physical therapy with pain in the R side of her neck x 3 weeks. She demonstrated decreased ROM, decreased facet mobility, increased tissue tightness, and poor posture. She would benefit from skilled PT to decrease her pain and increase her mobility in order to decrease future movement related issues. PROBLEM LIST (Impacting functional limitations):  1. Decreased Strength  2. Decreased ADL/Functional Activities  3. Increased Pain  4. Decreased Activity Tolerance  5. Decreased Flexibility/Joint Mobility  6. Decreased [place] with Home Exercise Program INTERVENTIONS PLANNED:  1. Cold  2. Electrical Stimulation  3. Heat  4. Home Exercise Program (HEP)  5. Manual Therapy  6. Range of Motion (ROM)  7. Therapeutic Activites  8. Therapeutic Exercise/Strengthening   TREATMENT PLAN:  Effective Dates: 3/23/2018 TO 2018 (90 days). Frequency/Duration: 2 times a week for 90 Days  GOALS: (Goals have been discussed and agreed upon with patient.)   Discharge Goals: Time Frame: 90 days   1. Patient will be independent with HEP.   2. Patient will have a decrease on the NDI to 10 indicating improved functional mobility. 3. Patient will report a decrease in pain to 2/10 in order to complete ADLs without difficulty. 4. Patient will demonstrate proper ergonomics for working at her computer in order decrease risk of reinjury. Rehabilitation Potential For Stated Goals: Good  Regarding Sergo Camilo's therapy, I certify that the treatment plan above will be carried out by a therapist or under their direction. Thank you for this referral,  Danuta Rebollar PTA, NCS     Referring Physician Signature: Tavon Jc MD              Date                    HISTORY:   Patient Stated Goal:        To get rid of my pain  History of Present Injury/Illness (Reason for Referral):  Patient reports pain in her neck on the R. They did an x-ray and it showed arthritis. It feels like it keeps getting worse. She reports putting heat and ice on it. She works at a computer all day. She also has fibromyalgia. She has taken some muscle relaxers but it makes her over sleep. Sleeps on her L side, 1 pillow. Pain med help. Working makes her pain worse. Past Medical History/Comorbidities:   Ms. Louisa Fleming  has a past medical history of Actinic keratitis; Allergic rhinitis (7/21/2016); Anxiety (7/21/2016); Arthralgia; Bacterial infection of skin (7/21/2016); Benign essential HTN (7/21/2016); Breast lump (6/3/13); Bronchitis; CAD (coronary artery disease) (7/21/2016); Cellulitis of left eyelid; Chest pain; Chronic pain (7/21/2016); Chronic pruritus (7/21/2016); COPD; COPD (chronic obstructive pulmonary disease) (HCC) (7/21/2016); CRP elevated; Decreased hearing, left (7/21/2016); Depressive disorder (7/21/2016); Diabetes ([facility] [address] ([id number]); Diabetes mellitus, type 2 ([facility] [address] (7/21/2016); Diarrhea; Elevated LFTs; Excessive sleepiness (7/21/2016);  Fatigue; Fibromyalgia; Gallstones; Ganglion cyst; Gastroenteritis; Gastrointestinal disorder; GERD (gastroesophageal reflux disease); Hand pain; Hidradenitis; Hot flashes; Hyperlipidemia (7/21/2016); Hypersomnolence (7/18/2016); Malaise and fatigue (7/21/2016); Menopause (7/21/2016); Nausea; Neurological disorder; Obesity (7/21/2016); KATHY (obstructive sleep apnea) (7/18/2016); Osteopenia (7/21/2016); Otitis externa, chronic (7/21/2016); Otitis media; Psychiatric disorder; Restless legs (7/18/2016); RLS (restless legs syndrome) (7/21/2016); Sinusitis; SOB (shortness of breath); Strain of thoracic region; Tension type headache; Tobacco user (7/18/2016); and Vitamin D deficiency. Ms. Geovany Spangler  has a past surgical history that includes hx orthopaedic; hx heent; hx gyn; hx orthopaedic (2015); hx cholecystectomy; hx tonsillectomy; hx gyn; and hx gyn (1987). Social History/Living Environment:     lives with . Driving, independent with ADLs  Prior Level of Function/Work/Activity:  Works at a computer all day. Dominant Side:         RIGHT  Current Medications:    Current Outpatient Prescriptions:     carisoprodol (SOMA) 350 mg tablet, Take 0.5-1 Tabs by mouth nightly as needed for Muscle Spasm(s). Max Daily Amount: 350 mg., Disp: 30 Tab, Rfl: 0    colesevelam (WELCHOL) 625 mg tablet, 6 tablets at lunch, Disp: 180 Tab, Rfl: 11    sertraline (ZOLOFT) 50 mg tablet, Take 2 Tabs by mouth daily. Two tablets daily  Indications: ANXIETY WITH DEPRESSION, Disp: 180 Tab, Rfl: 3    lisinopril (PRINIVIL, ZESTRIL) 5 mg tablet, Take 1 Tab by mouth daily. To lower blood pressure, Disp: 90 Tab, Rfl: 3    estradiol (ESTRACE) 2 mg tablet, Take 1 Tab by mouth daily. Indications: VASOMOTOR SYMPTOMS ASSOCIATED WITH MENOPAUSE, Disp: 90 Tab, Rfl: 3    ezetimibe-simvastatin (VYTORIN) 10-20 mg per tablet, Take 1 Tab by mouth daily. To lower cholesterol, Disp: 90 Tab, Rfl: 3    pioglitazone (ACTOS) 30 mg tablet, Take 1 Tab by mouth daily.  Indications: type 2 diabetes mellitus, am, Disp: 90 Tab, Rfl: 3    omega-3 acid ethyl esters (LOVAZA) 1 gram capsule, Take 4 Caps by mouth daily (with breakfast). , Disp: 360 Cap, Rfl: 3    oxyCODONE-acetaminophen (PERCOCET 10)  mg per tablet, Take 1 Tab by mouth every six (6) hours as needed for Pain. Max Daily Amount: 4 Tabs. Indications: Pain, Disp: 120 Tab, Rfl: 0    azithromycin (ZITHROMAX) 250 mg tablet, Take two(2) the first day,  Then one per day for 4 more days. , Disp: 6 Tab, Rfl: 0    omeprazole (PRILOSEC) 40 mg capsule, Take 1 Cap by mouth daily. , Disp: 90 Cap, Rfl: 3    gentamicin (GARAMYCIN) 0.3 % ophthalmic solution, Administer 2 Drops to both eyes every four (4) hours. , Disp: 5 mL, Rfl: 0    albuterol (PROVENTIL VENTOLIN) 2.5 mg /3 mL (0.083 %) nebulizer solution, USE 1 VIAL VIA NEBULIZER EVERY 6 HOURS AS NEEDED FOR WHEEZING, Disp: , Rfl: 0    promethazine-codeine (PHENERGAN WITH CODEINE) 6.25-10 mg/5 mL syrup, Take 5 mL by mouth four (4) times daily as needed for Cough. Max Daily Amount: 20 mL., Disp: 240 mL, Rfl: 0    albuterol (PROVENTIL HFA, VENTOLIN HFA, PROAIR HFA) 90 mcg/actuation inhaler, Take 2 Puffs by inhalation every four (4) hours. , Disp: 1 Inhaler, Rfl: 0    ALPRAZolam (XANAX) 0.5 mg tablet, Take 0.5-1 Tabs by mouth three (3) times daily as needed for Anxiety. Max Daily Amount: 1.5 mg., Disp: 60 Tab, Rfl: 2    Armodafinil (NUVIGIL) 150 mg tab, 1.5 tablets daily  Indications: SLEEPINESS DUE TO OBSTRUCTIVE SLEEP APNEA, Disp: 45 Tab, Rfl: 5    diphenoxylate-atropine (LOMOTIL) 2.5-0.025 mg per tablet, Take 1-2 Tabs by mouth four (4) times daily as needed for Diarrhea. Max Daily Amount: 8 Tabs., Disp: 60 Tab, Rfl: 0    metoclopramide HCl (REGLAN) 10 mg tablet, Take 0.5-1 Tabs by mouth Before breakfast, lunch, dinner and at bedtime for 30 days. For nausea, bloating and belching, Disp: 120 Tab, Rfl: 2    resorcinol-sulfur 2-8 % topical cream, Apply  to affected area two (2) times a day., Disp: 1 Tube, Rfl: 11    levocetirizine (XYZAL) 5 mg tablet, Take 1 Tab by mouth daily. , Disp: 90 Tab, Rfl: 3   doxycycline (VIBRAMYCIN) 100 mg capsule, Take 1 Cap by mouth two (2) times a day., Disp: 60 Cap, Rfl: 5    DULoxetine (CYMBALTA) 30 mg capsule, Take 1 Cap by mouth daily. Indications: ANXIETY WITH DEPRESSION, Disp: 90 Cap, Rfl: 3    cholecalciferol, vitamin D3, 2,000 unit tab, Take 2,000 Units by mouth daily. , Disp: , Rfl:     clotrimazole-betamethasone (LOTRISONE) topical cream, Apply  to affected area three (3) times daily. , Disp: , Rfl:     aspirin 81 mg tablet, Take 81 mg by mouth. Last dose 7/15/12  Indications: am, Disp: , Rfl:      Date Last Reviewed:  4/6/2018   Number of Personal Factors/Comorbidities that affect the Plan of Care: 1-2: MODERATE COMPLEXITY   EXAMINATION:   Observation/Orthostatic Postural Assessment:          Rounded shoulders, forward head   Mental Status:          WFL  Palpation:          Decreased movement R facet joints C5-T4. Patient with palpation to occiput, R first rib mobilization, scalenes   Sensation:         WFL  Skin Integrity:          intact  Vision:          WFL  Balance:          Functional     R cervical rotation 80% with pain  L cervical rotation 100% with soreness  L sidebend 10% with pain  R sidebend 70% no pain   Extension full ROM with pain  Flexion no pain full ROM    Upper Extremity:         Strength grossly 4+/5  Functional Mobility:         Gait/Ambulation:  No significant deficits         Transfers:  independent        Bed Mobility:  Pain with mobility in neck and low back. independent        Stairs:  NT        Wheelchair:  NT              Body Structures Involved:  1. Nerves  2. Bones  3. Joints  4. Muscles  5. Ligaments Body Functions Affected:  1. Sensory/Pain  2. Neuromusculoskeletal  3. Movement Related Activities and Participation Affected:  1. Mobility  2. Domestic Life  3. Interpersonal Interactions and Relationships  4.  Community, Social and [place]   Number of elements that affect the Plan of Care: 4+: HIGH COMPLEXITY   CLINICAL PRESENTATION: Presentation: Stable and uncomplicated: LOW COMPLEXITY   CLINICAL DECISION MAKING:   Outcome Measure: Tool Used: Neck Disability Index (NDI)  Score:  Initial: 22/50  Most Recent: X/50 (Date: -- )   Interpretation of Score: The Neck Disability Index is a revised form of the Oswestry Low Back Pain Index and is designed to measure the activities of daily living in person's with neck pain. Each section is scored on a 0-5 scale, 5 representing the greatest disability. The scores of each section are added together for a total score of 50. Medical Necessity:   · Patient is expected to demonstrate progress in range of motion and functional technique to decrease pain and compensatory movements with ADLs and IADL. · Patient demonstrates good rehab potential due to higher previous functional level. Reason for Services/Other Comments:  · Patient continues to require modification of therapeutic interventions to increase complexity of exercises. Use of outcome tool(s) and clinical judgement create a POC that gives a: Questionable prediction of patient's progress: MODERATE COMPLEXITY   TREATMENT:   (In addition to Assessment/Re-Assessment sessions the following treatments were rendered)  Pre Treatment Symptoms: Patient reports her pain level is 6/10 and states that her neck hurts with cervical extension and begins to look up but moves only minimally and she states that it also hurts more with     Modalities: (20 minutes total) patient supine with knee wedge in place for moist heat to neck and back for 10 minutes prior to manual to help decrease tightness and pain. Following manual ice pack to neck with patient supine and knee wedge in place to help decrease pain. Manual therapy:   (25 minutes) patient sitting for STM to bilateral upper traps, upper back and neck with patient requesting to sit because she said she was uncomfortable lying down.      Therapeutic Exercise: ( 0 minutes):  Exercises per grid below to improve mobility and strength. Required minimal visual and verbal cues to promote proper body alignment, promote proper body posture and promote proper body breathing techniques. Progressed resistance, range, repetitions and complexity of movement as indicated. Date:  3/29/18 Date:   Date:     Activity/Exercise Parameters Parameters Parameters   Scapular retraction X 10 reps       Shoulder shrugs X 10 reps  With discomfort in right neck area                                         Treatment/Session Assessment:  Patient reports she feels more loose following treatment today and states she feels better after the treatments but by the next day pain returns. Continue per plan of care. · Pain/ Symptoms: Initial:   6/10 Post Session: better but did not give a number. ·   Compliance with Program/Exercises: Will assess as treatment progresses. · Recommendations/Intent for next treatment session: \"Next visit will focus on advancements to more challenging activities and reduction in assistance provided\".   Total Treatment Duration:  PT Patient Time In/Time Out  Time In: 1430  Time Out: [facility]

## 2018-04-09 ENCOUNTER — HOSPITAL ENCOUNTER (OUTPATIENT)
Dept: PHYSICAL THERAPY | Age: 53
Discharge: HOME | End: 2018-04-09
Payer: COMMERCIAL

## 2018-04-09 PROCEDURE — 97110 THERAPEUTIC EXERCISES: CPT

## 2018-04-09 PROCEDURE — 97140 MANUAL THERAPY 1/> REGIONS: CPT

## 2018-04-09 NOTE — PROGRESS NOTES
Joshua Serum  : 1965  Primary: [facility]  Secondary:  [address]  at [facility]  [address], [place] [address] [address] [facility]  [phone]   [phone]         OUTPATIENT PHYSICAL THERAPY:Progress Report 2018    ICD-10: Treatment Diagnosis: Cervicalgia (M54.2)  Precautions/Allergies: Other food; Abilify [aripiprazole]; Augmentin [amoxicillin-pot clavulanate]; Fioricet [butalbital-acetaminophen-caff]; Levaquin [levofloxacin]; Lithium; Prednisone; Risperidone; Serzone [nefazodone]; Wellbutrin [bupropion hcl]; and Zoloft [sertraline]   Fall Risk Score: 0 (? 5 = High Risk)  MD Orders: evaluate and treat MEDICAL/REFERRING DIAGNOSIS:  Neck pain [M54.2]   DATE OF ONSET: 3 weeks ago  REFERRING PHYSICIAN: Mauricio Rayo MD  RETURN PHYSICIAN APPOINTMENT: 2018     ASSESSMENT (Date: 18):  Ms. Hoa Delgado has been seen in physical therapy for 6 visits since 3/23/18 for neck pain. She has now decreased her pain to 2/10. She has also had a 7 point decrease on the neck disability index. She had demonstrated good progress towards goals adonay continues to benefit from skilled PT in order to return to her full PLOF. PROBLEM LIST (Impacting functional limitations):  1. Decreased Strength  2. Decreased ADL/Functional Activities  3. Increased Pain  4. Decreased Activity Tolerance  5. Decreased Flexibility/Joint Mobility  6. Decreased [place] with Home Exercise Program INTERVENTIONS PLANNED:  1. Cold  2. Electrical Stimulation  3. Heat  4. Home Exercise Program (HEP)  5. Manual Therapy  6. Range of Motion (ROM)  7. Therapeutic Activites  8. Therapeutic Exercise/Strengthening   TREATMENT PLAN:  Effective Dates: 3/23/2018 TO 2018 (90 days). Frequency/Duration: 2 times a week for 90 Days  GOALS: (Goals have been discussed and agreed upon with patient.)   Discharge Goals: Time Frame: 90 days   1. Patient will be independent with HEP.  NOT MET  2. Patient will have a decrease on the NDI to 10 indicating improved functional mobility. NOT MET  3. Patient will report a decrease in pain to 2/10 in order to complete ADLs without difficulty. MET  4. Patient will demonstrate proper ergonomics for working at her computer in order decrease risk of reinjury. MET  Rehabilitation Potential For Stated Goals: Good  Regarding Cristy Frame therapy, I certify that the treatment plan above will be carried out by a therapist or under their direction. Thank you for this referral,  Sharita Pagan, DPT, NCS                 HISTORY:   Patient Stated Goal:        To get rid of my pain  History of Present Injury/Illness (Reason for Referral):  Patient reports pain in her neck on the R. They did an x-ray and it showed arthritis. It feels like it keeps getting worse. She reports putting heat and ice on it. She works at a computer all day. She also has fibromyalgia. She has taken some muscle relaxers but it makes her over sleep. Sleeps on her L side, 1 pillow. Pain med help. Working makes her pain worse. Past Medical History/Comorbidities:   Ms. Angel Burns  has a past medical history of Actinic keratitis; Allergic rhinitis (7/21/2016); Anxiety (7/21/2016); Arthralgia; Bacterial infection of skin (7/21/2016); Benign essential HTN (7/21/2016); Breast lump (6/3/13); Bronchitis; CAD (coronary artery disease) (7/21/2016); Cellulitis of left eyelid; Chest pain; Chronic pain (7/21/2016); Chronic pruritus (7/21/2016); COPD; COPD (chronic obstructive pulmonary disease) (HCC) (7/21/2016); CRP elevated; Decreased hearing, left (7/21/2016); Depressive disorder (7/21/2016); Diabetes ([facility] [address] ([id number]); Diabetes mellitus, type 2 ([facility] [address] (7/21/2016); Diarrhea; Elevated LFTs; Excessive sleepiness (7/21/2016); Fatigue; Fibromyalgia; Gallstones; Ganglion cyst; Gastroenteritis; Gastrointestinal disorder; GERD (gastroesophageal reflux disease); Hand pain; Hidradenitis;  Hot flashes; Hyperlipidemia (7/21/2016); Hypersomnolence (7/18/2016); Malaise and fatigue (7/21/2016); Menopause (7/21/2016); Nausea; Neurological disorder; Obesity (7/21/2016); KATHY (obstructive sleep apnea) (7/18/2016); Osteopenia (7/21/2016); Otitis externa, chronic (7/21/2016); Otitis media; Psychiatric disorder; Restless legs (7/18/2016); RLS (restless legs syndrome) (7/21/2016); Sinusitis; SOB (shortness of breath); Strain of thoracic region; Tension type headache; Tobacco user (7/18/2016); and Vitamin D deficiency. Ms. Mariela Sims  has a past surgical history that includes hx orthopaedic; hx heent; hx gyn; hx orthopaedic (2015); hx cholecystectomy; hx tonsillectomy; hx gyn; and hx gyn (1987). Social History/Living Environment:     lives with . Driving, independent with ADLs  Prior Level of Function/Work/Activity:  Works at a computer all day. Dominant Side:         RIGHT  Current Medications:    Current Outpatient Prescriptions:     carisoprodol (SOMA) 350 mg tablet, Take 0.5-1 Tabs by mouth nightly as needed for Muscle Spasm(s). Max Daily Amount: 350 mg., Disp: 30 Tab, Rfl: 0    colesevelam (WELCHOL) 625 mg tablet, 6 tablets at lunch, Disp: 180 Tab, Rfl: 11    sertraline (ZOLOFT) 50 mg tablet, Take 2 Tabs by mouth daily. Two tablets daily  Indications: ANXIETY WITH DEPRESSION, Disp: 180 Tab, Rfl: 3    lisinopril (PRINIVIL, ZESTRIL) 5 mg tablet, Take 1 Tab by mouth daily. To lower blood pressure, Disp: 90 Tab, Rfl: 3    estradiol (ESTRACE) 2 mg tablet, Take 1 Tab by mouth daily. Indications: VASOMOTOR SYMPTOMS ASSOCIATED WITH MENOPAUSE, Disp: 90 Tab, Rfl: 3    ezetimibe-simvastatin (VYTORIN) 10-20 mg per tablet, Take 1 Tab by mouth daily. To lower cholesterol, Disp: 90 Tab, Rfl: 3    pioglitazone (ACTOS) 30 mg tablet, Take 1 Tab by mouth daily. Indications: type 2 diabetes mellitus, am, Disp: 90 Tab, Rfl: 3    omega-3 acid ethyl esters (LOVAZA) 1 gram capsule, Take 4 Caps by mouth daily (with breakfast). , Disp: 360 Cap, Rfl: 3    oxyCODONE-acetaminophen (PERCOCET 10)  mg per tablet, Take 1 Tab by mouth every six (6) hours as needed for Pain. Max Daily Amount: 4 Tabs. Indications: Pain, Disp: 120 Tab, Rfl: 0    azithromycin (ZITHROMAX) 250 mg tablet, Take two(2) the first day,  Then one per day for 4 more days. , Disp: 6 Tab, Rfl: 0    omeprazole (PRILOSEC) 40 mg capsule, Take 1 Cap by mouth daily. , Disp: 90 Cap, Rfl: 3    gentamicin (GARAMYCIN) 0.3 % ophthalmic solution, Administer 2 Drops to both eyes every four (4) hours. , Disp: 5 mL, Rfl: 0    albuterol (PROVENTIL VENTOLIN) 2.5 mg /3 mL (0.083 %) nebulizer solution, USE 1 VIAL VIA NEBULIZER EVERY 6 HOURS AS NEEDED FOR WHEEZING, Disp: , Rfl: 0    promethazine-codeine (PHENERGAN WITH CODEINE) 6.25-10 mg/5 mL syrup, Take 5 mL by mouth four (4) times daily as needed for Cough. Max Daily Amount: 20 mL., Disp: 240 mL, Rfl: 0    albuterol (PROVENTIL HFA, VENTOLIN HFA, PROAIR HFA) 90 mcg/actuation inhaler, Take 2 Puffs by inhalation every four (4) hours. , Disp: 1 Inhaler, Rfl: 0    ALPRAZolam (XANAX) 0.5 mg tablet, Take 0.5-1 Tabs by mouth three (3) times daily as needed for Anxiety. Max Daily Amount: 1.5 mg., Disp: 60 Tab, Rfl: 2    Armodafinil (NUVIGIL) 150 mg tab, 1.5 tablets daily  Indications: SLEEPINESS DUE TO OBSTRUCTIVE SLEEP APNEA, Disp: 45 Tab, Rfl: 5    diphenoxylate-atropine (LOMOTIL) 2.5-0.025 mg per tablet, Take 1-2 Tabs by mouth four (4) times daily as needed for Diarrhea. Max Daily Amount: 8 Tabs., Disp: 60 Tab, Rfl: 0    metoclopramide HCl (REGLAN) 10 mg tablet, Take 0.5-1 Tabs by mouth Before breakfast, lunch, dinner and at bedtime for 30 days. For nausea, bloating and belching, Disp: 120 Tab, Rfl: 2    resorcinol-sulfur 2-8 % topical cream, Apply  to affected area two (2) times a day., Disp: 1 Tube, Rfl: 11    levocetirizine (XYZAL) 5 mg tablet, Take 1 Tab by mouth daily. , Disp: 90 Tab, Rfl: 3    doxycycline (VIBRAMYCIN) 100 mg capsule, Take 1 Cap by mouth two (2) times a day., Disp: 60 Cap, Rfl: 5    DULoxetine (CYMBALTA) 30 mg capsule, Take 1 Cap by mouth daily. Indications: ANXIETY WITH DEPRESSION, Disp: 90 Cap, Rfl: 3    cholecalciferol, vitamin D3, 2,000 unit tab, Take 2,000 Units by mouth daily. , Disp: , Rfl:     clotrimazole-betamethasone (LOTRISONE) topical cream, Apply  to affected area three (3) times daily. , Disp: , Rfl:     aspirin 81 mg tablet, Take 81 mg by mouth. Last dose 7/15/12  Indications: am, Disp: , Rfl:      Date Last Reviewed:  4/9/2018   Number of Personal Factors/Comorbidities that affect the Plan of Care: 1-2: MODERATE COMPLEXITY   EXAMINATION:   Observation/Orthostatic Postural Assessment:          Rounded shoulders, forward head   Mental Status:          WFL  Palpation:          Decreased movement R facet joints C5-T4. Patient with palpation to occiput, R first rib mobilization, scalenes   Sensation:         WFL  Skin Integrity:          intact  Vision:          WFL  Balance:          Functional     R cervical rotation 80% with minimal pain  L cervical rotation 100% with soreness  L sidebend 30% with pain  R sidebend 70% no pain   Extension full ROM with pain  Flexion no pain full ROM    Upper Extremity:         Strength grossly 4+/5  Functional Mobility:         Gait/Ambulation:  No significant deficits         Transfers:  independent        Bed Mobility:  Pain with mobility in neck and low back. independent        Stairs:  NT        Wheelchair:  NT              Body Structures Involved:  1. Nerves  2. Bones  3. Joints  4. Muscles  5. Ligaments Body Functions Affected:  1. Sensory/Pain  2. Neuromusculoskeletal  3. Movement Related Activities and Participation Affected:  1. Mobility  2. Domestic Life  3. Interpersonal Interactions and Relationships  4.  Community, Social and [place]   Number of elements that affect the Plan of Care: 4+: HIGH COMPLEXITY   CLINICAL PRESENTATION:   Presentation: Stable and uncomplicated: LOW COMPLEXITY   CLINICAL DECISION MAKING:   Outcome Measure: Tool Used: Neck Disability Index (NDI)  Score:  Initial: 22/50  Most Recent: 13/50 (Date: 4/9/18)   Interpretation of Score: The Neck Disability Index is a revised form of the Oswestry Low Back Pain Index and is designed to measure the activities of daily living in person's with neck pain. Each section is scored on a 0-5 scale, 5 representing the greatest disability. The scores of each section are added together for a total score of 50. Medical Necessity:   · Patient is expected to demonstrate progress in range of motion and functional technique to decrease pain and compensatory movements with ADLs and IADL. · Patient demonstrates good rehab potential due to higher previous functional level. Reason for Services/Other Comments:  · Patient continues to require modification of therapeutic interventions to increase complexity of exercises. Use of outcome tool(s) and clinical judgement create a POC that gives a: Questionable prediction of patient's progress: MODERATE COMPLEXITY   TREATMENT:   (In addition to Assessment/Re-Assessment sessions the following treatments were rendered)  Pre Treatment Symptoms: Patient reports she is doing better. Her pain is 2/10 today. \"therapy must be helping\". Modalities: (10 minutes total) x 5 minutes moist heat to cervical spine in sitting to improve tissue mobility prior to therapy. Post treatment ice x 5 minutes to cervical spine in sitting to decrease inflammation and pain. Manual therapy:   (30 minutes) patient sitting for STM to bilateral upper traps, upper back and neck. Joint mobilizations grade 1-2 to lower cervical and upper thoracic spine to improve joint play. Therapeutic Exercise: ( 10 minutes):  Exercises per grid below to improve mobility and strength.   Required minimal visual and verbal cues to promote proper body alignment, promote proper body posture and promote proper body breathing techniques. Progressed resistance, range, repetitions and complexity of movement as indicated. Date:  3/29/18 Date:  4/9/18 Date:     Activity/Exercise Parameters Parameters Parameters   Scapular retraction X 10 reps   2 x 10 reps with yellow band    Shoulder shrugs X 10 reps  With discomfort in right neck area X 10 reps    Standing self joint mobiliziation with 1 hand behind head and trunk rotation to that side  X 3 reps to each side    Shoulder cirlces  X 10 reps each direction                             Treatment/Session Assessment: See assessment above. · Pain/ Symptoms: Initial:   2/10 Post Session: 2/10 ·   Compliance with Program/Exercises: Will assess as treatment progresses. · Recommendations/Intent for next treatment session: \"Next visit will focus on advancements to more challenging activities and reduction in assistance provided\".   Total Treatment Duration:  PT Patient Time In/Time Out  Time In: 0900  Time Out: [id number], KARAN

## 2018-04-13 ENCOUNTER — APPOINTMENT (OUTPATIENT)
Dept: PHYSICAL THERAPY | Age: 53
End: 2018-04-13
Payer: COMMERCIAL

## 2018-04-13 ENCOUNTER — HOSPITAL ENCOUNTER (OUTPATIENT)
Dept: PHYSICAL THERAPY | Age: 53
Discharge: HOME | End: 2018-04-13
Payer: COMMERCIAL

## 2018-04-13 PROCEDURE — 97110 THERAPEUTIC EXERCISES: CPT

## 2018-04-13 PROCEDURE — 97140 MANUAL THERAPY 1/> REGIONS: CPT

## 2018-04-13 NOTE — PROGRESS NOTES
Noni Aschoff  : 1965  Primary: Amanda Calzada [facility]  Secondary:  [address]  at [address] [facility] [address], [address], [place], [facility]  [phone]   [phone]         OUTPATIENT PHYSICAL THERAPY:Progress Report 2018    ICD-10: Treatment Diagnosis: Cervicalgia (M54.2)  Precautions/Allergies: Other food; Abilify [aripiprazole]; Augmentin [amoxicillin-pot clavulanate]; Fioricet [butalbital-acetaminophen-caff]; Levaquin [levofloxacin]; Lithium; Prednisone; Risperidone; Serzone [nefazodone]; Wellbutrin [bupropion hcl]; and Zoloft [sertraline]   Fall Risk Score: 0 (? 5 = High Risk)  MD Orders: evaluate and treat MEDICAL/REFERRING DIAGNOSIS:  Neck pain [M54.2]   DATE OF ONSET: 3 weeks ago  REFERRING PHYSICIAN: Ana Maria Grimes MD  RETURN PHYSICIAN APPOINTMENT: 2018     ASSESSMENT (Date: 18):  Ms. Abdi Cameron has been seen in physical therapy for 6 visits since 3/23/18 for neck pain. She has now decreased her pain to 2/10. She has also had a 7 point decrease on the neck disability index. She had demonstrated good progress towards goals adonay continues to benefit from skilled PT in order to return to her full PLOF. PROBLEM LIST (Impacting functional limitations):  1. Decreased Strength  2. Decreased ADL/Functional Activities  3. Increased Pain  4. Decreased Activity Tolerance  5. Decreased Flexibility/Joint Mobility  6. Decreased [place] with Home Exercise Program INTERVENTIONS PLANNED:  1. Cold  2. Electrical Stimulation  3. Heat  4. Home Exercise Program (HEP)  5. Manual Therapy  6. Range of Motion (ROM)  7. Therapeutic Activites  8. Therapeutic Exercise/Strengthening   TREATMENT PLAN:  Effective Dates: 3/23/2018 TO 2018 (90 days). Frequency/Duration: 2 times a week for 90 Days  GOALS: (Goals have been discussed and agreed upon with patient.)   Discharge Goals: Time Frame: 90 days   1. Patient will be independent with HEP.  NOT MET  2. Patient will have a decrease on the NDI to 10 indicating improved functional mobility. NOT MET  3. Patient will report a decrease in pain to 2/10 in order to complete ADLs without difficulty. MET  4. Patient will demonstrate proper ergonomics for working at her computer in order decrease risk of reinjury. MET  Rehabilitation Potential For Stated Goals: Good  Regarding Ciro Forbes therapy, I certify that the treatment plan above will be carried out by a therapist or under their direction. Thank you for this referral,  Danette Vang, PTA, NCS                 HISTORY:   Patient Stated Goal:        To get rid of my pain  History of Present Injury/Illness (Reason for Referral):  Patient reports pain in her neck on the R. They did an x-ray and it showed arthritis. It feels like it keeps getting worse. She reports putting heat and ice on it. She works at a computer all day. She also has fibromyalgia. She has taken some muscle relaxers but it makes her over sleep. Sleeps on her L side, 1 pillow. Pain med help. Working makes her pain worse. Past Medical History/Comorbidities:   Ms. Juvenal Serna  has a past medical history of Actinic keratitis; Allergic rhinitis (7/21/2016); Anxiety (7/21/2016); Arthralgia; Bacterial infection of skin (7/21/2016); Benign essential HTN (7/21/2016); Breast lump (6/3/13); Bronchitis; CAD (coronary artery disease) (7/21/2016); Cellulitis of left eyelid; Chest pain; Chronic pain (7/21/2016); Chronic pruritus (7/21/2016); COPD; COPD (chronic obstructive pulmonary disease) (HCC) (7/21/2016); CRP elevated; Decreased hearing, left (7/21/2016); Depressive disorder (7/21/2016); Diabetes ([facility] [address] ([id number]); Diabetes mellitus, type 2 ([facility] [address] (7/21/2016); Diarrhea; Elevated LFTs; Excessive sleepiness (7/21/2016); Fatigue; Fibromyalgia; Gallstones; Ganglion cyst; Gastroenteritis; Gastrointestinal disorder; GERD (gastroesophageal reflux disease); Hand pain; Hidradenitis;  Hot flashes; Hyperlipidemia (7/21/2016); Hypersomnolence (7/18/2016); Malaise and fatigue (7/21/2016); Menopause (7/21/2016); Nausea; Neurological disorder; Obesity (7/21/2016); KATHY (obstructive sleep apnea) (7/18/2016); Osteopenia (7/21/2016); Otitis externa, chronic (7/21/2016); Otitis media; Psychiatric disorder; Restless legs (7/18/2016); RLS (restless legs syndrome) (7/21/2016); Sinusitis; SOB (shortness of breath); Strain of thoracic region; Tension type headache; Tobacco user (7/18/2016); and Vitamin D deficiency. Ms. Samaria Malin  has a past surgical history that includes hx orthopaedic; hx heent; hx gyn; hx orthopaedic (2015); hx cholecystectomy; hx tonsillectomy; hx gyn; and hx gyn (1987). Social History/Living Environment:     lives with . Driving, independent with ADLs  Prior Level of Function/Work/Activity:  Works at a computer all day. Dominant Side:         RIGHT  Current Medications:    Current Outpatient Prescriptions:     carisoprodol (SOMA) 350 mg tablet, Take 0.5-1 Tabs by mouth nightly as needed for Muscle Spasm(s). Max Daily Amount: 350 mg., Disp: 30 Tab, Rfl: 0    colesevelam (WELCHOL) 625 mg tablet, 6 tablets at lunch, Disp: 180 Tab, Rfl: 11    sertraline (ZOLOFT) 50 mg tablet, Take 2 Tabs by mouth daily. Two tablets daily  Indications: ANXIETY WITH DEPRESSION, Disp: 180 Tab, Rfl: 3    lisinopril (PRINIVIL, ZESTRIL) 5 mg tablet, Take 1 Tab by mouth daily. To lower blood pressure, Disp: 90 Tab, Rfl: 3    estradiol (ESTRACE) 2 mg tablet, Take 1 Tab by mouth daily. Indications: VASOMOTOR SYMPTOMS ASSOCIATED WITH MENOPAUSE, Disp: 90 Tab, Rfl: 3    ezetimibe-simvastatin (VYTORIN) 10-20 mg per tablet, Take 1 Tab by mouth daily. To lower cholesterol, Disp: 90 Tab, Rfl: 3    pioglitazone (ACTOS) 30 mg tablet, Take 1 Tab by mouth daily. Indications: type 2 diabetes mellitus, am, Disp: 90 Tab, Rfl: 3    omega-3 acid ethyl esters (LOVAZA) 1 gram capsule, Take 4 Caps by mouth daily (with breakfast). , Disp: 360 Cap, Rfl: 3    oxyCODONE-acetaminophen (PERCOCET 10)  mg per tablet, Take 1 Tab by mouth every six (6) hours as needed for Pain. Max Daily Amount: 4 Tabs. Indications: Pain, Disp: 120 Tab, Rfl: 0    azithromycin (ZITHROMAX) 250 mg tablet, Take two(2) the first day,  Then one per day for 4 more days. , Disp: 6 Tab, Rfl: 0    omeprazole (PRILOSEC) 40 mg capsule, Take 1 Cap by mouth daily. , Disp: 90 Cap, Rfl: 3    gentamicin (GARAMYCIN) 0.3 % ophthalmic solution, Administer 2 Drops to both eyes every four (4) hours. , Disp: 5 mL, Rfl: 0    albuterol (PROVENTIL VENTOLIN) 2.5 mg /3 mL (0.083 %) nebulizer solution, USE 1 VIAL VIA NEBULIZER EVERY 6 HOURS AS NEEDED FOR WHEEZING, Disp: , Rfl: 0    promethazine-codeine (PHENERGAN WITH CODEINE) 6.25-10 mg/5 mL syrup, Take 5 mL by mouth four (4) times daily as needed for Cough. Max Daily Amount: 20 mL., Disp: 240 mL, Rfl: 0    albuterol (PROVENTIL HFA, VENTOLIN HFA, PROAIR HFA) 90 mcg/actuation inhaler, Take 2 Puffs by inhalation every four (4) hours. , Disp: 1 Inhaler, Rfl: 0    ALPRAZolam (XANAX) 0.5 mg tablet, Take 0.5-1 Tabs by mouth three (3) times daily as needed for Anxiety. Max Daily Amount: 1.5 mg., Disp: 60 Tab, Rfl: 2    Armodafinil (NUVIGIL) 150 mg tab, 1.5 tablets daily  Indications: SLEEPINESS DUE TO OBSTRUCTIVE SLEEP APNEA, Disp: 45 Tab, Rfl: 5    diphenoxylate-atropine (LOMOTIL) 2.5-0.025 mg per tablet, Take 1-2 Tabs by mouth four (4) times daily as needed for Diarrhea. Max Daily Amount: 8 Tabs., Disp: 60 Tab, Rfl: 0    metoclopramide HCl (REGLAN) 10 mg tablet, Take 0.5-1 Tabs by mouth Before breakfast, lunch, dinner and at bedtime for 30 days. For nausea, bloating and belching, Disp: 120 Tab, Rfl: 2    resorcinol-sulfur 2-8 % topical cream, Apply  to affected area two (2) times a day., Disp: 1 Tube, Rfl: 11    levocetirizine (XYZAL) 5 mg tablet, Take 1 Tab by mouth daily. , Disp: 90 Tab, Rfl: 3    doxycycline (VIBRAMYCIN) 100 mg capsule, Take 1 Cap by mouth two (2) times a day., Disp: 60 Cap, Rfl: 5    DULoxetine (CYMBALTA) 30 mg capsule, Take 1 Cap by mouth daily. Indications: ANXIETY WITH DEPRESSION, Disp: 90 Cap, Rfl: 3    cholecalciferol, vitamin D3, 2,000 unit tab, Take 2,000 Units by mouth daily. , Disp: , Rfl:     clotrimazole-betamethasone (LOTRISONE) topical cream, Apply  to affected area three (3) times daily. , Disp: , Rfl:     aspirin 81 mg tablet, Take 81 mg by mouth. Last dose 7/15/12  Indications: am, Disp: , Rfl:      Date Last Reviewed:  4/13/2018   Number of Personal Factors/Comorbidities that affect the Plan of Care: 1-2: MODERATE COMPLEXITY   EXAMINATION:   Observation/Orthostatic Postural Assessment:          Rounded shoulders, forward head   Mental Status:          WFL  Palpation:          Decreased movement R facet joints C5-T4. Patient with palpation to occiput, R first rib mobilization, scalenes   Sensation:         WFL  Skin Integrity:          intact  Vision:          WFL  Balance:          Functional     R cervical rotation 80% with minimal pain  L cervical rotation 100% with soreness  L sidebend 30% with pain  R sidebend 70% no pain   Extension full ROM with pain  Flexion no pain full ROM    Upper Extremity:         Strength grossly 4+/5  Functional Mobility:         Gait/Ambulation:  No significant deficits         Transfers:  independent        Bed Mobility:  Pain with mobility in neck and low back. independent        Stairs:  NT        Wheelchair:  NT              Body Structures Involved:  1. Nerves  2. Bones  3. Joints  4. Muscles  5. Ligaments Body Functions Affected:  1. Sensory/Pain  2. Neuromusculoskeletal  3. Movement Related Activities and Participation Affected:  1. Mobility  2. Domestic Life  3. Interpersonal Interactions and Relationships  4.  Community, Social and [place]   Number of elements that affect the Plan of Care: 4+: HIGH COMPLEXITY   CLINICAL PRESENTATION:   Presentation: Stable and uncomplicated: LOW COMPLEXITY   CLINICAL DECISION MAKING:   Outcome Measure: Tool Used: Neck Disability Index (NDI)  Score:  Initial: 22/50  Most Recent: 13/50 (Date: 4/9/18)   Interpretation of Score: The Neck Disability Index is a revised form of the Oswestry Low Back Pain Index and is designed to measure the activities of daily living in person's with neck pain. Each section is scored on a 0-5 scale, 5 representing the greatest disability. The scores of each section are added together for a total score of 50. Medical Necessity:   · Patient is expected to demonstrate progress in range of motion and functional technique to decrease pain and compensatory movements with ADLs and IADL. · Patient demonstrates good rehab potential due to higher previous functional level. Reason for Services/Other Comments:  · Patient continues to require modification of therapeutic interventions to increase complexity of exercises. Use of outcome tool(s) and clinical judgement create a POC that gives a: Questionable prediction of patient's progress: MODERATE COMPLEXITY   TREATMENT:   (In addition to Assessment/Re-Assessment sessions the following treatments were rendered)  Pre Treatment Symptoms: Patient reports she is doing better this morning but last night it was very painful and I could not go to sleep. Pain level today is 4/10. It is awful sore when I move it. I was better after last visit and the therapist really worked on that spot on the right side of my neck. Patient states she is going to see Dr. Rick Phan this afternoon and will discuss her progress with him. Patient continues to have palpable tenderness and tightness with pain in right cervical, upper trap and suboccipital area. Modalities: (15 minutes) patient sitting with arms supported for moist heat to neck and back to help decrease tension and pain and help improve mobility.        Manual therapy:   (25 minutes) patient supine with knee wedge in place for STM to bilateral upper traps, cervical paraspinals, Scalenes, occiput area followed by suboccipital release. Focus more on     Therapeutic Exercise: ( 15 minutes):  Exercises per grid below to improve mobility and strength. Required minimal visual and verbal cues to promote proper body alignment, promote proper body posture and promote proper body breathing techniques. Progressed resistance, range, repetitions and complexity of movement as indicated. Date:  3/29/18 Date:  4/9/18 Date:     Activity/Exercise Parameters Parameters Parameters   Shoulder extension   Yellow theraband  2 x 10   Scapular retraction X 10 reps   2 x 10 reps with yellow band Yellow theraband  2 x 10 reps   Shoulder shrugs X 10 reps  With discomfort in right neck area X 10 reps X 10 reps    Standing self joint mobiliziation with 1 hand behind head and trunk rotation to that side  X 3 reps to each side X 2 reps each side with pain each side   Shoulder cirlces  X 10 reps each direction  2 x 10 reps   nodding   2 x 5 reps                     Treatment/Session Assessment:  Patient reports decreased tightness and improved mobility. Patient tolerated exercises but with some pain during exercises. Patient states that at times she feels her fibromyalgia affects the neck pain. Continue per plan of care. · Pain/ Symptoms: Initial:   2/10 Post Session: 2/10 ·   Compliance with Program/Exercises: Will assess as treatment progresses. · Recommendations/Intent for next treatment session: \"Next visit will focus on advancements to more challenging activities and reduction in assistance provided\".   Total Treatment Duration:  PT Patient Time In/Time Out  Time In: 0845  Time Out: [address] Mary Ku, PTA

## 2018-04-16 ENCOUNTER — HOSPITAL ENCOUNTER (OUTPATIENT)
Dept: PHYSICAL THERAPY | Age: 53
Discharge: HOME | End: 2018-04-16
Payer: COMMERCIAL

## 2018-04-16 PROCEDURE — 97110 THERAPEUTIC EXERCISES: CPT

## 2018-04-16 PROCEDURE — 97140 MANUAL THERAPY 1/> REGIONS: CPT

## 2018-04-16 NOTE — PROGRESS NOTES
Joshua Serum  : 1965  Primary: [facility]  Secondary:  [id number] at [facility]  [facility], [address] [place] [address], [address]  [phone]   [phone]         OUTPATIENT PHYSICAL THERAPY:Daily Note 2018    ICD-10: Treatment Diagnosis: Cervicalgia (M54.2)  Precautions/Allergies: Other food; Abilify [aripiprazole]; Augmentin [amoxicillin-pot clavulanate]; Fioricet [butalbital-acetaminophen-caff]; Levaquin [levofloxacin]; Lithium; Prednisone; Risperidone; Serzone [nefazodone]; Wellbutrin [bupropion hcl]; and Zoloft [sertraline]   Fall Risk Score: 0 (? 5 = High Risk)  MD Orders: evaluate and treat MEDICAL/REFERRING DIAGNOSIS:  Neck pain [M54.2]   DATE OF ONSET: 3 weeks ago  REFERRING PHYSICIAN: Mauricio Rayo MD  RETURN PHYSICIAN APPOINTMENT: 2018     ASSESSMENT (Date: 18):  Ms. Hoa Delgado has been seen in physical therapy for 6 visits since 3/23/18 for neck pain. She has now decreased her pain to 2/10. She has also had a 7 point decrease on the neck disability index. She had demonstrated good progress towards goals adonay continues to benefit from skilled PT in order to return to her full PLOF. PROBLEM LIST (Impacting functional limitations):  1. Decreased Strength  2. Decreased ADL/Functional Activities  3. Increased Pain  4. Decreased Activity Tolerance  5. Decreased Flexibility/Joint Mobility  6. Decreased [place] with Home Exercise Program INTERVENTIONS PLANNED:  1. Cold  2. Electrical Stimulation  3. Heat  4. Home Exercise Program (HEP)  5. Manual Therapy  6. Range of Motion (ROM)  7. Therapeutic Activites  8. Therapeutic Exercise/Strengthening   TREATMENT PLAN:  Effective Dates: 3/23/2018 TO 2018 (90 days). Frequency/Duration: 2 times a week for 90 Days  GOALS: (Goals have been discussed and agreed upon with patient.)   Discharge Goals: Time Frame: 90 days   1. Patient will be independent with HEP. NOT MET  2.  Patient will have a decrease on the NDI to 10 indicating improved functional mobility. NOT MET  3. Patient will report a decrease in pain to 2/10 in order to complete ADLs without difficulty. MET  4. Patient will demonstrate proper ergonomics for working at her computer in order decrease risk of reinjury. MET  Rehabilitation Potential For Stated Goals: Good  Regarding Leonel Vasquez therapy, I certify that the treatment plan above will be carried out by a therapist or under their direction. Thank you for this referral,  Estela Smoker, PTA, NCS                 HISTORY:   Patient Stated Goal:        To get rid of my pain  History of Present Injury/Illness (Reason for Referral):  Patient reports pain in her neck on the R. They did an x-ray and it showed arthritis. It feels like it keeps getting worse. She reports putting heat and ice on it. She works at a computer all day. She also has fibromyalgia. She has taken some muscle relaxers but it makes her over sleep. Sleeps on her L side, 1 pillow. Pain med help. Working makes her pain worse. Past Medical History/Comorbidities:   Ms. Yamilet Ortiz  has a past medical history of Actinic keratitis; Allergic rhinitis (7/21/2016); Anxiety (7/21/2016); Arthralgia; Bacterial infection of skin (7/21/2016); Benign essential HTN (7/21/2016); Breast lump (6/3/13); Bronchitis; CAD (coronary artery disease) (7/21/2016); Cellulitis of left eyelid; Chest pain; Chronic pain (7/21/2016); Chronic pruritus (7/21/2016); COPD; COPD (chronic obstructive pulmonary disease) (HCC) (7/21/2016); CRP elevated; Decreased hearing, left (7/21/2016); Depressive disorder (7/21/2016); Diabetes ([facility] [address] ([id number]); Diabetes mellitus, type 2 ([address] (7/21/2016); Diarrhea; Elevated LFTs; Excessive sleepiness (7/21/2016); Fatigue; Fibromyalgia; Gallstones; Ganglion cyst; Gastroenteritis; Gastrointestinal disorder; GERD (gastroesophageal reflux disease); Hand pain; Hidradenitis; Hot flashes;  Hyperlipidemia (7/21/2016); Hypersomnolence (7/18/2016); Malaise and fatigue (7/21/2016); Menopause (7/21/2016); Nausea; Neurological disorder; Obesity (7/21/2016); KATHY (obstructive sleep apnea) (7/18/2016); Osteopenia (7/21/2016); Otitis externa, chronic (7/21/2016); Otitis media; Psychiatric disorder; Restless legs (7/18/2016); RLS (restless legs syndrome) (7/21/2016); Sinusitis; SOB (shortness of breath); Strain of thoracic region; Tension type headache; Tobacco user (7/18/2016); and Vitamin D deficiency. Ms. Dorota Solitario  has a past surgical history that includes hx orthopaedic; hx heent; hx gyn; hx orthopaedic (2015); hx cholecystectomy; hx tonsillectomy; hx gyn; and hx gyn (1987). Social History/Living Environment:     lives with . Driving, independent with ADLs  Prior Level of Function/Work/Activity:  Works at a computer all day. Dominant Side:         RIGHT  Current Medications:    Current Outpatient Prescriptions:     carisoprodol (SOMA) 350 mg tablet, Take 0.5-1 Tabs by mouth nightly as needed for Muscle Spasm(s). Max Daily Amount: 350 mg., Disp: 30 Tab, Rfl: 0    colesevelam (WELCHOL) 625 mg tablet, 6 tablets at lunch, Disp: 180 Tab, Rfl: 11    sertraline (ZOLOFT) 50 mg tablet, Take 2 Tabs by mouth daily. Two tablets daily  Indications: ANXIETY WITH DEPRESSION, Disp: 180 Tab, Rfl: 3    lisinopril (PRINIVIL, ZESTRIL) 5 mg tablet, Take 1 Tab by mouth daily. To lower blood pressure, Disp: 90 Tab, Rfl: 3    estradiol (ESTRACE) 2 mg tablet, Take 1 Tab by mouth daily. Indications: VASOMOTOR SYMPTOMS ASSOCIATED WITH MENOPAUSE, Disp: 90 Tab, Rfl: 3    ezetimibe-simvastatin (VYTORIN) 10-20 mg per tablet, Take 1 Tab by mouth daily. To lower cholesterol, Disp: 90 Tab, Rfl: 3    pioglitazone (ACTOS) 30 mg tablet, Take 1 Tab by mouth daily. Indications: type 2 diabetes mellitus, am, Disp: 90 Tab, Rfl: 3    omega-3 acid ethyl esters (LOVAZA) 1 gram capsule, Take 4 Caps by mouth daily (with breakfast). , Disp: 360 Cap, Rfl: 3    oxyCODONE-acetaminophen (PERCOCET 10)  mg per tablet, Take 1 Tab by mouth every six (6) hours as needed for Pain. Max Daily Amount: 4 Tabs. Indications: Pain, Disp: 120 Tab, Rfl: 0    azithromycin (ZITHROMAX) 250 mg tablet, Take two(2) the first day,  Then one per day for 4 more days. , Disp: 6 Tab, Rfl: 0    omeprazole (PRILOSEC) 40 mg capsule, Take 1 Cap by mouth daily. , Disp: 90 Cap, Rfl: 3    gentamicin (GARAMYCIN) 0.3 % ophthalmic solution, Administer 2 Drops to both eyes every four (4) hours. , Disp: 5 mL, Rfl: 0    albuterol (PROVENTIL VENTOLIN) 2.5 mg /3 mL (0.083 %) nebulizer solution, USE 1 VIAL VIA NEBULIZER EVERY 6 HOURS AS NEEDED FOR WHEEZING, Disp: , Rfl: 0    promethazine-codeine (PHENERGAN WITH CODEINE) 6.25-10 mg/5 mL syrup, Take 5 mL by mouth four (4) times daily as needed for Cough. Max Daily Amount: 20 mL., Disp: 240 mL, Rfl: 0    albuterol (PROVENTIL HFA, VENTOLIN HFA, PROAIR HFA) 90 mcg/actuation inhaler, Take 2 Puffs by inhalation every four (4) hours. , Disp: 1 Inhaler, Rfl: 0    ALPRAZolam (XANAX) 0.5 mg tablet, Take 0.5-1 Tabs by mouth three (3) times daily as needed for Anxiety. Max Daily Amount: 1.5 mg., Disp: 60 Tab, Rfl: 2    Armodafinil (NUVIGIL) 150 mg tab, 1.5 tablets daily  Indications: SLEEPINESS DUE TO OBSTRUCTIVE SLEEP APNEA, Disp: 45 Tab, Rfl: 5    diphenoxylate-atropine (LOMOTIL) 2.5-0.025 mg per tablet, Take 1-2 Tabs by mouth four (4) times daily as needed for Diarrhea. Max Daily Amount: 8 Tabs., Disp: 60 Tab, Rfl: 0    metoclopramide HCl (REGLAN) 10 mg tablet, Take 0.5-1 Tabs by mouth Before breakfast, lunch, dinner and at bedtime for 30 days. For nausea, bloating and belching, Disp: 120 Tab, Rfl: 2    resorcinol-sulfur 2-8 % topical cream, Apply  to affected area two (2) times a day., Disp: 1 Tube, Rfl: 11    levocetirizine (XYZAL) 5 mg tablet, Take 1 Tab by mouth daily. , Disp: 90 Tab, Rfl: 3    doxycycline (VIBRAMYCIN) 100 mg capsule, Take 1 Cap by mouth two (2) times a day., Disp: 60 Cap, Rfl: 5    DULoxetine (CYMBALTA) 30 mg capsule, Take 1 Cap by mouth daily. Indications: ANXIETY WITH DEPRESSION, Disp: 90 Cap, Rfl: 3    cholecalciferol, vitamin D3, 2,000 unit tab, Take 2,000 Units by mouth daily. , Disp: , Rfl:     clotrimazole-betamethasone (LOTRISONE) topical cream, Apply  to affected area three (3) times daily. , Disp: , Rfl:     aspirin 81 mg tablet, Take 81 mg by mouth. Last dose 7/15/12  Indications: am, Disp: , Rfl:      Date Last Reviewed:  4/16/2018   Number of Personal Factors/Comorbidities that affect the Plan of Care: 1-2: MODERATE COMPLEXITY   EXAMINATION:   Observation/Orthostatic Postural Assessment:          Rounded shoulders, forward head   Mental Status:          WFL  Palpation:          Decreased movement R facet joints C5-T4. Patient with palpation to occiput, R first rib mobilization, scalenes   Sensation:         WFL  Skin Integrity:          intact  Vision:          WFL  Balance:          Functional     R cervical rotation 80% with minimal pain  L cervical rotation 100% with soreness  L sidebend 30% with pain  R sidebend 70% no pain   Extension full ROM with pain  Flexion no pain full ROM    Upper Extremity:         Strength grossly 4+/5  Functional Mobility:         Gait/Ambulation:  No significant deficits         Transfers:  independent        Bed Mobility:  Pain with mobility in neck and low back. independent        Stairs:  NT        Wheelchair:  NT              Body Structures Involved:  1. Nerves  2. Bones  3. Joints  4. Muscles  5. Ligaments Body Functions Affected:  1. Sensory/Pain  2. Neuromusculoskeletal  3. Movement Related Activities and Participation Affected:  1. Mobility  2. Domestic Life  3. Interpersonal Interactions and Relationships  4.  Community, Social and [place]   Number of elements that affect the Plan of Care: 4+: HIGH COMPLEXITY   CLINICAL PRESENTATION:   Presentation: Stable and uncomplicated: LOW COMPLEXITY   CLINICAL DECISION MAKING:   Outcome Measure: Tool Used: Neck Disability Index (NDI)  Score:  Initial: 22/50  Most Recent: 13/50 (Date: 4/9/18)   Interpretation of Score: The Neck Disability Index is a revised form of the Oswestry Low Back Pain Index and is designed to measure the activities of daily living in person's with neck pain. Each section is scored on a 0-5 scale, 5 representing the greatest disability. The scores of each section are added together for a total score of 50. Medical Necessity:   · Patient is expected to demonstrate progress in range of motion and functional technique to decrease pain and compensatory movements with ADLs and IADL. · Patient demonstrates good rehab potential due to higher previous functional level. Reason for Services/Other Comments:  · Patient continues to require modification of therapeutic interventions to increase complexity of exercises. Use of outcome tool(s) and clinical judgement create a POC that gives a: Questionable prediction of patient's progress: MODERATE COMPLEXITY   TREATMENT:   (In addition to Assessment/Re-Assessment sessions the following treatments were rendered)  Pre Treatment Symptoms: Patient reports she had an appointment with Dr. Chris Ventura and he recommended patient to continue with physical therapy. Patient reports she worked in her mothers yard over the weekend with increased pain following activity. Patient states she had to do this for her mother because she is the only one that can or will help her and her mother is not well. Patient reports pain level 7/10 today with pain in neck, right more than left, and upper back. Modalities: (15 minutes) patient sitting with arms supported for moist heat to neck and back to help decrease tension and pain and help improve mobility prior to manual and ice pack x 10 minutes with patient supine and knee wedge in place to neck to help decrease pain.        Manual therapy:   (25 minutes) Patient sitting for STM to neck, upper traps and upper back, and pectoralis areas. Trigger point release to bilateral upper traps. And then patient supine with knee wedge in place for STM to bilateral upper traps,  cervical paraspinals, Scalenes, occiput area followed by suboccipital release. Palpable tightness in all areas with notable tenderness and tightness in bilateral upper traps. Therapeutic Exercise: ( 10 minutes):  Exercises per grid below to improve mobility and strength. Required minimal visual and verbal cues to promote proper body alignment, promote proper body posture and promote proper body breathing techniques. Progressed resistance, range, repetitions and complexity of movement as indicated. Date:  3/29/18 Date:  4/9/18 Date:  4/13/18 Date  4/16/18   Activity/Exercise Parameters Parameters Parameters    Shoulder extension   Yellow theraband  2 x 10 x   Scapular retraction X 10 reps   2 x 10 reps with yellow band Yellow theraband  2 x 10 reps Yellow theratubing  2 x 10 reps    Shoulder shrugs X 10 reps  With discomfort in right neck area X 10 reps X 10 reps  X 10 reps B   Standing self joint mobiliziation with 1 hand behind head and trunk rotation to that side  X 3 reps to each side X 2 reps each side with pain each side    Shoulder cirlces  X 10 reps each direction  2 x 10 reps X 10 reps   nodding   2 x 5 reps X 5 reps                       Treatment/Session Assessment:  Patient reports decreased tightness and improved mobility. Patient tolerated exercises but still with some pain during exercises. Patient states she performed yard  work at her mothers with increased tightness and pain following activity. Continue per plan of care. · Pain/ Symptoms: Initial:  7 /10 Post Session: some better with improved mobility. No number given ·   Compliance with Program/Exercises: Will assess as treatment progresses. · Recommendations/Intent for next treatment session:  \"Next visit will focus on advancements to more challenging activities and reduction in assistance provided\".   Total Treatment Duration:  PT Patient Time In/Time Out  Time In: 0845  Time Out: [address], MICHAEL

## 2018-04-18 ENCOUNTER — HOSPITAL ENCOUNTER (OUTPATIENT)
Dept: PHYSICAL THERAPY | Age: 53
Discharge: HOME | End: 2018-04-18
Payer: COMMERCIAL

## 2018-04-18 PROCEDURE — 97140 MANUAL THERAPY 1/> REGIONS: CPT

## 2018-04-18 PROCEDURE — 97110 THERAPEUTIC EXERCISES: CPT

## 2018-04-18 NOTE — PROGRESS NOTES
Alex Goodwin  : 1965  Primary: Jerald Montgomery  Secondary:  [address] [facility], [address], [place] [address] [address] [facility]  [phone]   [phone]         OUTPATIENT PHYSICAL THERAPY:Daily Note 2018    ICD-10: Treatment Diagnosis: Cervicalgia (M54.2)  Precautions/Allergies: Other food; Abilify [aripiprazole]; Augmentin [amoxicillin-pot clavulanate]; Fioricet [butalbital-acetaminophen-caff]; Levaquin [levofloxacin]; Lithium; Prednisone; Risperidone; Serzone [nefazodone]; Wellbutrin [bupropion hcl]; and Zoloft [sertraline]   Fall Risk Score: 0 (? 5 = High Risk)  MD Orders: evaluate and treat MEDICAL/REFERRING DIAGNOSIS:  Neck pain [M54.2]   DATE OF ONSET: 3 weeks ago  REFERRING PHYSICIAN: James Paulino MD  RETURN PHYSICIAN APPOINTMENT: 2018     ASSESSMENT (Date: 18):  Ms. Esau Lopez has been seen in physical therapy for 6 visits since 3/23/18 for neck pain. She has now decreased her pain to 2/10. She has also had a 7 point decrease on the neck disability index. She had demonstrated good progress towards goals adonay continues to benefit from skilled PT in order to return to her full PLOF. PROBLEM LIST (Impacting functional limitations):  1. Decreased Strength  2. Decreased ADL/Functional Activities  3. Increased Pain  4. Decreased Activity Tolerance  5. Decreased Flexibility/Joint Mobility  6. Decreased [place] with Home Exercise Program INTERVENTIONS PLANNED:  1. Cold  2. Electrical Stimulation  3. Heat  4. Home Exercise Program (HEP)  5. Manual Therapy  6. Range of Motion (ROM)  7. Therapeutic Activites  8. Therapeutic Exercise/Strengthening   TREATMENT PLAN:  Effective Dates: 3/23/2018 TO 2018 (90 days). Frequency/Duration: 2 times a week for 90 Days  GOALS: (Goals have been discussed and agreed upon with patient.)   Discharge Goals: Time Frame: 90 days   1. Patient will be independent with HEP. NOT MET  2.  Patient will have a decrease on the NDI to 10 indicating improved functional mobility. NOT MET  3. Patient will report a decrease in pain to 2/10 in order to complete ADLs without difficulty. MET  4. Patient will demonstrate proper ergonomics for working at her computer in order decrease risk of reinjury. MET  Rehabilitation Potential For Stated Goals: Good  Regarding [facility] therapy, I certify that the treatment plan above will be carried out by a therapist or under their direction. Thank you for this referral,  Chris Lamas, PT, NCS                 HISTORY:   Patient Stated Goal:        To get rid of my pain  History of Present Injury/Illness (Reason for Referral):  Patient reports pain in her neck on the R. They did an x-ray and it showed arthritis. It feels like it keeps getting worse. She reports putting heat and ice on it. She works at a computer all day. She also has fibromyalgia. She has taken some muscle relaxers but it makes her over sleep. Sleeps on her L side, 1 pillow. Pain med help. Working makes her pain worse. Past Medical History/Comorbidities:   Ms. Laureano Pop  has a past medical history of Actinic keratitis; Allergic rhinitis (7/21/2016); Anxiety (7/21/2016); Arthralgia; Bacterial infection of skin (7/21/2016); Benign essential HTN (7/21/2016); Breast lump (6/3/13); Bronchitis; CAD (coronary artery disease) (7/21/2016); Cellulitis of left eyelid; Chest pain; Chronic pain (7/21/2016); Chronic pruritus (7/21/2016); COPD; COPD (chronic obstructive pulmonary disease) (HCC) (7/21/2016); CRP elevated; Decreased hearing, left (7/21/2016); Depressive disorder (7/21/2016); Diabetes ([facility] [address] ([id number]); Diabetes mellitus, type 2 ([facility] [address] (7/21/2016); Diarrhea; Elevated LFTs; Excessive sleepiness (7/21/2016); Fatigue; Fibromyalgia; Gallstones; Ganglion cyst; Gastroenteritis; Gastrointestinal disorder; GERD (gastroesophageal reflux disease); Hand pain; Hidradenitis;  Hot flashes; Hyperlipidemia (7/21/2016); Hypersomnolence (7/18/2016); Malaise and fatigue (7/21/2016); Menopause (7/21/2016); Nausea; Neurological disorder; Obesity (7/21/2016); KATHY (obstructive sleep apnea) (7/18/2016); Osteopenia (7/21/2016); Otitis externa, chronic (7/21/2016); Otitis media; Psychiatric disorder; Restless legs (7/18/2016); RLS (restless legs syndrome) (7/21/2016); Sinusitis; SOB (shortness of breath); Strain of thoracic region; Tension type headache; Tobacco user (7/18/2016); and Vitamin D deficiency. Ms. Shawanda Yu  has a past surgical history that includes hx orthopaedic; hx heent; hx gyn; hx orthopaedic (2015); hx cholecystectomy; hx tonsillectomy; hx gyn; and hx gyn (1987). Social History/Living Environment:     lives with . Driving, independent with ADLs  Prior Level of Function/Work/Activity:  Works at a computer all day. Dominant Side:         RIGHT  Current Medications:    Current Outpatient Prescriptions:     carisoprodol (SOMA) 350 mg tablet, Take 0.5-1 Tabs by mouth nightly as needed for Muscle Spasm(s). Max Daily Amount: 350 mg., Disp: 30 Tab, Rfl: 0    colesevelam (WELCHOL) 625 mg tablet, 6 tablets at lunch, Disp: 180 Tab, Rfl: 11    sertraline (ZOLOFT) 50 mg tablet, Take 2 Tabs by mouth daily. Two tablets daily  Indications: ANXIETY WITH DEPRESSION, Disp: 180 Tab, Rfl: 3    lisinopril (PRINIVIL, ZESTRIL) 5 mg tablet, Take 1 Tab by mouth daily. To lower blood pressure, Disp: 90 Tab, Rfl: 3    estradiol (ESTRACE) 2 mg tablet, Take 1 Tab by mouth daily. Indications: VASOMOTOR SYMPTOMS ASSOCIATED WITH MENOPAUSE, Disp: 90 Tab, Rfl: 3    ezetimibe-simvastatin (VYTORIN) 10-20 mg per tablet, Take 1 Tab by mouth daily. To lower cholesterol, Disp: 90 Tab, Rfl: 3    pioglitazone (ACTOS) 30 mg tablet, Take 1 Tab by mouth daily. Indications: type 2 diabetes mellitus, am, Disp: 90 Tab, Rfl: 3    omega-3 acid ethyl esters (LOVAZA) 1 gram capsule, Take 4 Caps by mouth daily (with breakfast). , Disp: 360 Cap, Rfl: 3    oxyCODONE-acetaminophen (PERCOCET 10)  mg per tablet, Take 1 Tab by mouth every six (6) hours as needed for Pain. Max Daily Amount: 4 Tabs. Indications: Pain, Disp: 120 Tab, Rfl: 0    azithromycin (ZITHROMAX) 250 mg tablet, Take two(2) the first day,  Then one per day for 4 more days. , Disp: 6 Tab, Rfl: 0    omeprazole (PRILOSEC) 40 mg capsule, Take 1 Cap by mouth daily. , Disp: 90 Cap, Rfl: 3    gentamicin (GARAMYCIN) 0.3 % ophthalmic solution, Administer 2 Drops to both eyes every four (4) hours. , Disp: 5 mL, Rfl: 0    albuterol (PROVENTIL VENTOLIN) 2.5 mg /3 mL (0.083 %) nebulizer solution, USE 1 VIAL VIA NEBULIZER EVERY 6 HOURS AS NEEDED FOR WHEEZING, Disp: , Rfl: 0    promethazine-codeine (PHENERGAN WITH CODEINE) 6.25-10 mg/5 mL syrup, Take 5 mL by mouth four (4) times daily as needed for Cough. Max Daily Amount: 20 mL., Disp: 240 mL, Rfl: 0    albuterol (PROVENTIL HFA, VENTOLIN HFA, PROAIR HFA) 90 mcg/actuation inhaler, Take 2 Puffs by inhalation every four (4) hours. , Disp: 1 Inhaler, Rfl: 0    ALPRAZolam (XANAX) 0.5 mg tablet, Take 0.5-1 Tabs by mouth three (3) times daily as needed for Anxiety. Max Daily Amount: 1.5 mg., Disp: 60 Tab, Rfl: 2    Armodafinil (NUVIGIL) 150 mg tab, 1.5 tablets daily  Indications: SLEEPINESS DUE TO OBSTRUCTIVE SLEEP APNEA, Disp: 45 Tab, Rfl: 5    diphenoxylate-atropine (LOMOTIL) 2.5-0.025 mg per tablet, Take 1-2 Tabs by mouth four (4) times daily as needed for Diarrhea. Max Daily Amount: 8 Tabs., Disp: 60 Tab, Rfl: 0    metoclopramide HCl (REGLAN) 10 mg tablet, Take 0.5-1 Tabs by mouth Before breakfast, lunch, dinner and at bedtime for 30 days. For nausea, bloating and belching, Disp: 120 Tab, Rfl: 2    resorcinol-sulfur 2-8 % topical cream, Apply  to affected area two (2) times a day., Disp: 1 Tube, Rfl: 11    levocetirizine (XYZAL) 5 mg tablet, Take 1 Tab by mouth daily. , Disp: 90 Tab, Rfl: 3    doxycycline (VIBRAMYCIN) 100 mg capsule, Take 1 Cap by mouth two (2) times a day., Disp: 60 Cap, Rfl: 5    DULoxetine (CYMBALTA) 30 mg capsule, Take 1 Cap by mouth daily. Indications: ANXIETY WITH DEPRESSION, Disp: 90 Cap, Rfl: 3    cholecalciferol, vitamin D3, 2,000 unit tab, Take 2,000 Units by mouth daily. , Disp: , Rfl:     clotrimazole-betamethasone (LOTRISONE) topical cream, Apply  to affected area three (3) times daily. , Disp: , Rfl:     aspirin 81 mg tablet, Take 81 mg by mouth. Last dose 7/15/12  Indications: am, Disp: , Rfl:      Date Last Reviewed:  4/18/2018   Number of Personal Factors/Comorbidities that affect the Plan of Care: 1-2: MODERATE COMPLEXITY   EXAMINATION:   Observation/Orthostatic Postural Assessment:          Rounded shoulders, forward head   Mental Status:          WFL  Palpation:          Decreased movement R facet joints C5-T4. Patient with palpation to occiput, R first rib mobilization, scalenes   Sensation:         WFL  Skin Integrity:          intact  Vision:          WFL  Balance:          Functional     R cervical rotation 80% with minimal pain  L cervical rotation 100% with soreness  L sidebend 30% with pain  R sidebend 70% no pain   Extension full ROM with pain  Flexion no pain full ROM    Upper Extremity:         Strength grossly 4+/5  Functional Mobility:         Gait/Ambulation:  No significant deficits         Transfers:  independent        Bed Mobility:  Pain with mobility in neck and low back. independent        Stairs:  NT        Wheelchair:  NT              Body Structures Involved:  1. Nerves  2. Bones  3. Joints  4. Muscles  5. Ligaments Body Functions Affected:  1. Sensory/Pain  2. Neuromusculoskeletal  3. Movement Related Activities and Participation Affected:  1. Mobility  2. Domestic Life  3. Interpersonal Interactions and Relationships  4.  Community, Social and [place]   Number of elements that affect the Plan of Care: 4+: HIGH COMPLEXITY   CLINICAL PRESENTATION:   Presentation: Stable and uncomplicated: LOW COMPLEXITY   CLINICAL DECISION MAKING:   Outcome Measure: Tool Used: Neck Disability Index (NDI)  Score:  Initial: 22/50  Most Recent: 13/50 (Date: 4/9/18)   Interpretation of Score: The Neck Disability Index is a revised form of the Oswestry Low Back Pain Index and is designed to measure the activities of daily living in person's with neck pain. Each section is scored on a 0-5 scale, 5 representing the greatest disability. The scores of each section are added together for a total score of 50. Medical Necessity:   · Patient is expected to demonstrate progress in range of motion and functional technique to decrease pain and compensatory movements with ADLs and IADL. · Patient demonstrates good rehab potential due to higher previous functional level. Reason for Services/Other Comments:  · Patient continues to require modification of therapeutic interventions to increase complexity of exercises. Use of outcome tool(s) and clinical judgement create a POC that gives a: Questionable prediction of patient's progress: MODERATE COMPLEXITY   TREATMENT:   (In addition to Assessment/Re-Assessment sessions the following treatments were rendered)  Pre Treatment Symptoms: Patient reports that she has had increased pain and tightness with increased  Time working at her desk and continued stress at home. Modalities: (0 minutes) patient sitting with arms supported for moist heat to neck and back to help decrease tension and pain and help improve mobility prior to manual and ice pack x 10 minutes with patient supine and knee wedge in place to neck to help decrease pain. Manual therapy:   (25 minutes) Patient sitting for STM to neck, upper traps and upper back, and pectoralis areas. Trigger point release to bilateral upper traps. And then patient supine with knee wedge in place for STM to bilateral upper traps,  cervical paraspinals, Scalenes, occiput area followed by suboccipital release. First rib inferior glide mobilization on right, with MET on right. Prone P/A glides. Therapeutic Exercise: ( 15 minutes):  Exercises per grid below to improve mobility and strength. Required minimal visual and verbal cues to promote proper body alignment, promote proper body posture and promote proper body breathing techniques. Progressed resistance, range, repetitions and complexity of movement as indicated. Date:  3/29/18 Date:  4/9/18 Date:  4/13/18 Date  4/16/18 Date:  4/18/2018    Activity/Exercise Parameters Parameters Parameters      Shoulder extension   Yellow theraband  2 x 10 x     Scapular retraction X 10 reps   2 x 10 reps with yellow band Yellow theraband  2 x 10 reps Yellow theratubing  2 x 10 reps      Shoulder shrugs X 10 reps  With discomfort in right neck area X 10 reps X 10 reps  X 10 reps B     Standing self joint mobiliziation with 1 hand behind head and trunk rotation to that side  X 3 reps to each side X 2 reps each side with pain each side      Shoulder cirlces  X 10 reps each direction  2 x 10 reps X 10 reps     nodding   2 x 5 reps X 5 reps     ube     4'/4'    Wall slides     10x10\"    Doorway pec stretches     3x30\"          Treatment/Session Assessment:  Patient reports decreased tightness and improved mobility. Patient reported improved mobility with with overhead reaching after treatment. · Pain/ Symptoms: Initial:  7 /10 Post Session: some better with improved mobility. No number given ·   Compliance with Program/Exercises: Will assess as treatment progresses. · Recommendations/Intent for next treatment session: \"Next visit will focus on advancements to more challenging activities and reduction in assistance provided\".   Total Treatment Duration:  PT Patient Time In/Time Out  Time In: 0845  Time Out: [address], PT

## 2018-04-20 ENCOUNTER — APPOINTMENT (OUTPATIENT)
Dept: PHYSICAL THERAPY | Age: 53
End: 2018-04-20
Payer: COMMERCIAL

## 2018-04-23 ENCOUNTER — HOSPITAL ENCOUNTER (OUTPATIENT)
Dept: PHYSICAL THERAPY | Age: 53
Discharge: HOME | End: 2018-04-23
Payer: COMMERCIAL

## 2018-04-23 NOTE — PROGRESS NOTES
[facility]rapy Center at [place] [address] [address] [facility]  [phone]   [phone]     OUTPATIENT DAILY NOTE     DATE: 4/23/2018    SUBJECTIVE:  Patient cancelled for appointment today due to sickness. Will plan to follow up on next scheduled visit.     Gayatri Le, PTA

## 2018-04-27 ENCOUNTER — HOSPITAL ENCOUNTER (OUTPATIENT)
Dept: PHYSICAL THERAPY | Age: 53
Discharge: HOME | End: 2018-04-27
Payer: COMMERCIAL

## 2018-04-27 PROCEDURE — 97140 MANUAL THERAPY 1/> REGIONS: CPT

## 2018-04-27 NOTE — PROGRESS NOTES
Ankit Yany  : 1965  Primary: [facility]  Secondary:  [id number] at [facility]  [address], [address] [place] [address], [address]  [phone]   [phone]         OUTPATIENT PHYSICAL THERAPY:Daily Note 2018    ICD-10: Treatment Diagnosis: Cervicalgia (M54.2)  Precautions/Allergies: Other food; Abilify [aripiprazole]; Augmentin [amoxicillin-pot clavulanate]; Fioricet [butalbital-acetaminophen-caff]; Levaquin [levofloxacin]; Lithium; Prednisone; Risperidone; Serzone [nefazodone]; Wellbutrin [bupropion hcl]; and Zoloft [sertraline]   Fall Risk Score: 0 (? 5 = High Risk)  MD Orders: evaluate and treat MEDICAL/REFERRING DIAGNOSIS:  Neck pain [M54.2]   DATE OF ONSET: 3 weeks ago  REFERRING PHYSICIAN: Tavon Jc MD  RETURN PHYSICIAN APPOINTMENT: 2018     ASSESSMENT (Date: 18):  Ms. Louisa Fleming has been seen in physical therapy for 6 visits since 3/23/18 for neck pain. She has now decreased her pain to 2/10. She has also had a 7 point decrease on the neck disability index. She had demonstrated good progress towards goals adonay continues to benefit from skilled PT in order to return to her full PLOF. PROBLEM LIST (Impacting functional limitations):  1. Decreased Strength  2. Decreased ADL/Functional Activities  3. Increased Pain  4. Decreased Activity Tolerance  5. Decreased Flexibility/Joint Mobility  6. Decreased [place] with Home Exercise Program INTERVENTIONS PLANNED:  1. Cold  2. Electrical Stimulation  3. Heat  4. Home Exercise Program (HEP)  5. Manual Therapy  6. Range of Motion (ROM)  7. Therapeutic Activites  8. Therapeutic Exercise/Strengthening   TREATMENT PLAN:  Effective Dates: 3/23/2018 TO 2018 (90 days). Frequency/Duration: 2 times a week for 90 Days  GOALS: (Goals have been discussed and agreed upon with patient.)   Discharge Goals: Time Frame: 90 days   1. Patient will be independent with HEP. NOT MET  2.  Patient will have a decrease on the NDI to 10 indicating improved functional mobility. NOT MET  3. Patient will report a decrease in pain to 2/10 in order to complete ADLs without difficulty. MET  4. Patient will demonstrate proper ergonomics for working at her computer in order decrease risk of reinjury. MET  Rehabilitation Potential For Stated Goals: Good  Regarding Bisi Gong therapy, I certify that the treatment plan above will be carried out by a therapist or under their direction. Thank you for this referral,  Nahomy Thomas, PTA, NCS                 HISTORY:   Patient Stated Goal:        To get rid of my pain  History of Present Injury/Illness (Reason for Referral):  Patient reports pain in her neck on the R. They did an x-ray and it showed arthritis. It feels like it keeps getting worse. She reports putting heat and ice on it. She works at a computer all day. She also has fibromyalgia. She has taken some muscle relaxers but it makes her over sleep. Sleeps on her L side, 1 pillow. Pain med help. Working makes her pain worse. Past Medical History/Comorbidities:   Ms. Hoa Delgado  has a past medical history of Actinic keratitis; Allergic rhinitis (7/21/2016); Anxiety (7/21/2016); Arthralgia; Bacterial infection of skin (7/21/2016); Benign essential HTN (7/21/2016); Breast lump (6/3/13); Bronchitis; CAD (coronary artery disease) (7/21/2016); Cellulitis of left eyelid; Chest pain; Chronic pain (7/21/2016); Chronic pruritus (7/21/2016); COPD; COPD (chronic obstructive pulmonary disease) (HCC) (7/21/2016); CRP elevated; Decreased hearing, left (7/21/2016); Depressive disorder (7/21/2016); Diabetes ([facility] [address] ([id number]); Diabetes mellitus, type 2 ([facility] [address] (7/21/2016); Diarrhea; Elevated LFTs; Excessive sleepiness (7/21/2016); Fatigue; Fibromyalgia; Gallstones; Ganglion cyst; Gastroenteritis; Gastrointestinal disorder; GERD (gastroesophageal reflux disease); Hand pain; Hidradenitis; Hot flashes;  Hyperlipidemia (7/21/2016); Hypersomnolence (7/18/2016); Malaise and fatigue (7/21/2016); Menopause (7/21/2016); Nausea; Neurological disorder; Obesity (7/21/2016); KATHY (obstructive sleep apnea) (7/18/2016); Osteopenia (7/21/2016); Otitis externa, chronic (7/21/2016); Otitis media; Psychiatric disorder; Restless legs (7/18/2016); RLS (restless legs syndrome) (7/21/2016); Sinusitis; SOB (shortness of breath); Strain of thoracic region; Tension type headache; Tobacco user (7/18/2016); and Vitamin D deficiency. Ms. Dorota Solitario  has a past surgical history that includes hx orthopaedic; hx heent; hx gyn; hx orthopaedic (2015); hx cholecystectomy; hx tonsillectomy; hx gyn; and hx gyn (1987). Social History/Living Environment:     lives with . Driving, independent with ADLs  Prior Level of Function/Work/Activity:  Works at a computer all day. Dominant Side:         RIGHT  Current Medications:    Current Outpatient Prescriptions:     carisoprodol (SOMA) 350 mg tablet, Take 0.5-1 Tabs by mouth nightly as needed for Muscle Spasm(s). Max Daily Amount: 350 mg., Disp: 30 Tab, Rfl: 0    colesevelam (WELCHOL) 625 mg tablet, 6 tablets at lunch, Disp: 180 Tab, Rfl: 11    sertraline (ZOLOFT) 50 mg tablet, Take 2 Tabs by mouth daily. Two tablets daily  Indications: ANXIETY WITH DEPRESSION, Disp: 180 Tab, Rfl: 3    lisinopril (PRINIVIL, ZESTRIL) 5 mg tablet, Take 1 Tab by mouth daily. To lower blood pressure, Disp: 90 Tab, Rfl: 3    estradiol (ESTRACE) 2 mg tablet, Take 1 Tab by mouth daily. Indications: VASOMOTOR SYMPTOMS ASSOCIATED WITH MENOPAUSE, Disp: 90 Tab, Rfl: 3    ezetimibe-simvastatin (VYTORIN) 10-20 mg per tablet, Take 1 Tab by mouth daily. To lower cholesterol, Disp: 90 Tab, Rfl: 3    pioglitazone (ACTOS) 30 mg tablet, Take 1 Tab by mouth daily. Indications: type 2 diabetes mellitus, am, Disp: 90 Tab, Rfl: 3    omega-3 acid ethyl esters (LOVAZA) 1 gram capsule, Take 4 Caps by mouth daily (with breakfast). , Disp: 360 Cap, Rfl: 3    oxyCODONE-acetaminophen (PERCOCET 10)  mg per tablet, Take 1 Tab by mouth every six (6) hours as needed for Pain. Max Daily Amount: 4 Tabs. Indications: Pain, Disp: 120 Tab, Rfl: 0    azithromycin (ZITHROMAX) 250 mg tablet, Take two(2) the first day,  Then one per day for 4 more days. , Disp: 6 Tab, Rfl: 0    omeprazole (PRILOSEC) 40 mg capsule, Take 1 Cap by mouth daily. , Disp: 90 Cap, Rfl: 3    gentamicin (GARAMYCIN) 0.3 % ophthalmic solution, Administer 2 Drops to both eyes every four (4) hours. , Disp: 5 mL, Rfl: 0    albuterol (PROVENTIL VENTOLIN) 2.5 mg /3 mL (0.083 %) nebulizer solution, USE 1 VIAL VIA NEBULIZER EVERY 6 HOURS AS NEEDED FOR WHEEZING, Disp: , Rfl: 0    promethazine-codeine (PHENERGAN WITH CODEINE) 6.25-10 mg/5 mL syrup, Take 5 mL by mouth four (4) times daily as needed for Cough. Max Daily Amount: 20 mL., Disp: 240 mL, Rfl: 0    albuterol (PROVENTIL HFA, VENTOLIN HFA, PROAIR HFA) 90 mcg/actuation inhaler, Take 2 Puffs by inhalation every four (4) hours. , Disp: 1 Inhaler, Rfl: 0    ALPRAZolam (XANAX) 0.5 mg tablet, Take 0.5-1 Tabs by mouth three (3) times daily as needed for Anxiety. Max Daily Amount: 1.5 mg., Disp: 60 Tab, Rfl: 2    Armodafinil (NUVIGIL) 150 mg tab, 1.5 tablets daily  Indications: SLEEPINESS DUE TO OBSTRUCTIVE SLEEP APNEA, Disp: 45 Tab, Rfl: 5    diphenoxylate-atropine (LOMOTIL) 2.5-0.025 mg per tablet, Take 1-2 Tabs by mouth four (4) times daily as needed for Diarrhea. Max Daily Amount: 8 Tabs., Disp: 60 Tab, Rfl: 0    metoclopramide HCl (REGLAN) 10 mg tablet, Take 0.5-1 Tabs by mouth Before breakfast, lunch, dinner and at bedtime for 30 days. For nausea, bloating and belching, Disp: 120 Tab, Rfl: 2    resorcinol-sulfur 2-8 % topical cream, Apply  to affected area two (2) times a day., Disp: 1 Tube, Rfl: 11    levocetirizine (XYZAL) 5 mg tablet, Take 1 Tab by mouth daily. , Disp: 90 Tab, Rfl: 3    doxycycline (VIBRAMYCIN) 100 mg capsule, Take 1 Cap by mouth two (2) times a day., Disp: 60 Cap, Rfl: 5    DULoxetine (CYMBALTA) 30 mg capsule, Take 1 Cap by mouth daily. Indications: ANXIETY WITH DEPRESSION, Disp: 90 Cap, Rfl: 3    cholecalciferol, vitamin D3, 2,000 unit tab, Take 2,000 Units by mouth daily. , Disp: , Rfl:     clotrimazole-betamethasone (LOTRISONE) topical cream, Apply  to affected area three (3) times daily. , Disp: , Rfl:     aspirin 81 mg tablet, Take 81 mg by mouth. Last dose 7/15/12  Indications: am, Disp: , Rfl:      Date Last Reviewed:  4/27/2018   Number of Personal Factors/Comorbidities that affect the Plan of Care: 1-2: MODERATE COMPLEXITY   EXAMINATION:   Observation/Orthostatic Postural Assessment:          Rounded shoulders, forward head   Mental Status:          WFL  Palpation:          Decreased movement R facet joints C5-T4. Patient with palpation to occiput, R first rib mobilization, scalenes   Sensation:         WFL  Skin Integrity:          intact  Vision:          WFL  Balance:          Functional     R cervical rotation 80% with minimal pain  L cervical rotation 100% with soreness  L sidebend 30% with pain  R sidebend 70% no pain   Extension full ROM with pain  Flexion no pain full ROM    Upper Extremity:         Strength grossly 4+/5  Functional Mobility:         Gait/Ambulation:  No significant deficits         Transfers:  independent        Bed Mobility:  Pain with mobility in neck and low back. independent        Stairs:  NT        Wheelchair:  NT              Body Structures Involved:  1. Nerves  2. Bones  3. Joints  4. Muscles  5. Ligaments Body Functions Affected:  1. Sensory/Pain  2. Neuromusculoskeletal  3. Movement Related Activities and Participation Affected:  1. Mobility  2. Domestic Life  3. Interpersonal Interactions and Relationships  4.  Community, Social and [place]   Number of elements that affect the Plan of Care: 4+: HIGH COMPLEXITY   CLINICAL PRESENTATION:   Presentation: Stable and uncomplicated: LOW COMPLEXITY   CLINICAL DECISION MAKING:   Outcome Measure: Tool Used: Neck Disability Index (NDI)  Score:  Initial: 22/50  Most Recent: 13/50 (Date: 4/9/18)   Interpretation of Score: The Neck Disability Index is a revised form of the Oswestry Low Back Pain Index and is designed to measure the activities of daily living in person's with neck pain. Each section is scored on a 0-5 scale, 5 representing the greatest disability. The scores of each section are added together for a total score of 50. Medical Necessity:   · Patient is expected to demonstrate progress in range of motion and functional technique to decrease pain and compensatory movements with ADLs and IADL. · Patient demonstrates good rehab potential due to higher previous functional level. Reason for Services/Other Comments:  · Patient continues to require modification of therapeutic interventions to increase complexity of exercises. Use of outcome tool(s) and clinical judgement create a POC that gives a: Questionable prediction of patient's progress: MODERATE COMPLEXITY   TREATMENT:   (In addition to Assessment/Re-Assessment sessions the following treatments were rendered)  Pre Treatment Symptoms: Patient reports that she felt much better following the last visit. The manual treatment really helped. The bar was very helpful on the back. Patient states pain level is 3/10. Patient reports this is the best she has felt in months. Modalities: (8 minutes) Patient supine with knee wedge in place for ice pack to upper back and neck to help decrease pain. Manual therapy:   (25 minutes) Patient sitting for STM with hands and therapeutic bar , upper traps and upper back and along scapular border areas. Trigger point release to bilateral upper traps.   And then patient supine with knee wedge in place for STM to bilateral upper traps,  cervical paraspinals, Scalenes, occiput and especially over right SCM and suboccipital to help decrease tightness, followed by suboccipital release. Therapeutic Exercise: ( 15 minutes):  Exercises per grid below to improve mobility and strength. Required minimal visual and verbal cues to promote proper body alignment, promote proper body posture and promote proper body breathing techniques. Progressed resistance, range, repetitions and complexity of movement as indicated. Date:  3/29/18 Date:  4/9/18 Date:  4/13/18 Date  4/16/18 Date:  4/18/2018 Date  4/27/18   Activity/Exercise Parameters Parameters Parameters      Shoulder extension   Yellow theraband  2 x 10 x     Scapular retraction X 10 reps   2 x 10 reps with yellow band Yellow theraband  2 x 10 reps Yellow theratubing  2 x 10 reps      Shoulder shrugs X 10 reps  With discomfort in right neck area X 10 reps X 10 reps  X 10 reps B     Standing self joint mobiliziation with 1 hand behind head and trunk rotation to that side  X 3 reps to each side X 2 reps each side with pain each side      Shoulder cirlces  X 10 reps each direction  2 x 10 reps X 10 reps     nodding   2 x 5 reps X 5 reps     ube     4'/4' Level 2  4 minutes forward and 4 minutes backward   Wall slides     10x10\"    Doorway pec stretches     3x30\"          Treatment/Session Assessment:  Patient reports decreased tightness and improved mobility but increased soreness but patient stated it di the same last time and then it really helped and felt better. Patient with decreased palpable tenderness  · Pain/ Symptoms: Initial:  3/10 Post Session: Patient reports doing good and feeling better. Just sore. Pain level 4/10 ·   Compliance with Program/Exercises: Will assess as treatment progresses. · Recommendations/Intent for next treatment session: \"Next visit will focus on advancements to more challenging activities and reduction in assistance provided\".   Total Treatment Duration:  PT Patient Time In/Time Out  Time In: 1445  Time Out: 825 [address], [facility] [place]

## 2018-04-30 ENCOUNTER — HOSPITAL ENCOUNTER (OUTPATIENT)
Dept: PHYSICAL THERAPY | Age: 53
Discharge: HOME | End: 2018-04-30
Payer: COMMERCIAL

## 2018-04-30 PROCEDURE — 97140 MANUAL THERAPY 1/> REGIONS: CPT

## 2018-04-30 PROCEDURE — 97110 THERAPEUTIC EXERCISES: CPT

## 2018-04-30 NOTE — PROGRESS NOTES
Kandace Arnold  : 1965  Primary: German Montgomery  Secondary:  [address]  at [facility]  [address] [facility], [place] [address] [address] [facility]  [phone]   [phone]         OUTPATIENT PHYSICAL THERAPY:Daily Note 2018    ICD-10: Treatment Diagnosis: Cervicalgia (M54.2)  Precautions/Allergies: Other food; Abilify [aripiprazole]; Augmentin [amoxicillin-pot clavulanate]; Fioricet [butalbital-acetaminophen-caff]; Levaquin [levofloxacin]; Lithium; Prednisone; Risperidone; Serzone [nefazodone]; Wellbutrin [bupropion hcl]; and Zoloft [sertraline]   Fall Risk Score: 0 (? 5 = High Risk)  MD Orders: evaluate and treat MEDICAL/REFERRING DIAGNOSIS:  Neck pain [M54.2]   DATE OF ONSET: 3 weeks ago  REFERRING PHYSICIAN: Linnea Steele MD  RETURN PHYSICIAN APPOINTMENT: 2018     ASSESSMENT (Date: 18):  Ms. Fady Stubbs has been seen in physical therapy for 6 visits since 3/23/18 for neck pain. She has now decreased her pain to 2/10. She has also had a 7 point decrease on the neck disability index. She had demonstrated good progress towards goals adonay continues to benefit from skilled PT in order to return to her full PLOF. PROBLEM LIST (Impacting functional limitations):  1. Decreased Strength  2. Decreased ADL/Functional Activities  3. Increased Pain  4. Decreased Activity Tolerance  5. Decreased Flexibility/Joint Mobility  6. Decreased [place] with Home Exercise Program INTERVENTIONS PLANNED:  1. Cold  2. Electrical Stimulation  3. Heat  4. Home Exercise Program (HEP)  5. Manual Therapy  6. Range of Motion (ROM)  7. Therapeutic Activites  8. Therapeutic Exercise/Strengthening   TREATMENT PLAN:  Effective Dates: 3/23/2018 TO 2018 (90 days). Frequency/Duration: 2 times a week for 90 Days  GOALS: (Goals have been discussed and agreed upon with patient.)   Discharge Goals: Time Frame: 90 days   1. Patient will be independent with HEP. NOT MET  2.  Patient will have a decrease on the NDI to 10 indicating improved functional mobility. NOT MET  3. Patient will report a decrease in pain to 2/10 in order to complete ADLs without difficulty. MET  4. Patient will demonstrate proper ergonomics for working at her computer in order decrease risk of reinjury. MET  Rehabilitation Potential For Stated Goals: Good  Regarding Queta Butts therapy, I certify that the treatment plan above will be carried out by a therapist or under their direction. Thank you for this referral,  Geovanna Gilmore, PTA, NCS                 HISTORY:   Patient Stated Goal:        To get rid of my pain  History of Present Injury/Illness (Reason for Referral):  Patient reports pain in her neck on the R. They did an x-ray and it showed arthritis. It feels like it keeps getting worse. She reports putting heat and ice on it. She works at a computer all day. She also has fibromyalgia. She has taken some muscle relaxers but it makes her over sleep. Sleeps on her L side, 1 pillow. Pain med help. Working makes her pain worse. Past Medical History/Comorbidities:   Ms. Karla Sanchez  has a past medical history of Actinic keratitis; Allergic rhinitis (7/21/2016); Anxiety (7/21/2016); Arthralgia; Bacterial infection of skin (7/21/2016); Benign essential HTN (7/21/2016); Breast lump (6/3/13); Bronchitis; CAD (coronary artery disease) (7/21/2016); Cellulitis of left eyelid; Chest pain; Chronic pain (7/21/2016); Chronic pruritus (7/21/2016); COPD; COPD (chronic obstructive pulmonary disease) (HCC) (7/21/2016); CRP elevated; Decreased hearing, left (7/21/2016); Depressive disorder (7/21/2016); Diabetes ([facility] [address] ([id number]); Diabetes mellitus, type 2 ([address] (7/21/2016); Diarrhea; Elevated LFTs; Excessive sleepiness (7/21/2016); Fatigue; Fibromyalgia; Gallstones; Ganglion cyst; Gastroenteritis; Gastrointestinal disorder; GERD (gastroesophageal reflux disease); Hand pain; Hidradenitis; Hot flashes;  Hyperlipidemia (7/21/2016); Hypersomnolence (7/18/2016); Malaise and fatigue (7/21/2016); Menopause (7/21/2016); Nausea; Neurological disorder; Obesity (7/21/2016); KATHY (obstructive sleep apnea) (7/18/2016); Osteopenia (7/21/2016); Otitis externa, chronic (7/21/2016); Otitis media; Psychiatric disorder; Restless legs (7/18/2016); RLS (restless legs syndrome) (7/21/2016); Sinusitis; SOB (shortness of breath); Strain of thoracic region; Tension type headache; Tobacco user (7/18/2016); and Vitamin D deficiency. Ms. Ritika Hays  has a past surgical history that includes hx orthopaedic; hx heent; hx gyn; hx orthopaedic (2015); hx cholecystectomy; hx tonsillectomy; hx gyn; and hx gyn (1987). Social History/Living Environment:     lives with . Driving, independent with ADLs  Prior Level of Function/Work/Activity:  Works at a computer all day. Dominant Side:         RIGHT  Current Medications:    Current Outpatient Prescriptions:     carisoprodol (SOMA) 350 mg tablet, Take 0.5-1 Tabs by mouth nightly as needed for Muscle Spasm(s). Max Daily Amount: 350 mg., Disp: 30 Tab, Rfl: 0    colesevelam (WELCHOL) 625 mg tablet, 6 tablets at lunch, Disp: 180 Tab, Rfl: 11    sertraline (ZOLOFT) 50 mg tablet, Take 2 Tabs by mouth daily. Two tablets daily  Indications: ANXIETY WITH DEPRESSION, Disp: 180 Tab, Rfl: 3    lisinopril (PRINIVIL, ZESTRIL) 5 mg tablet, Take 1 Tab by mouth daily. To lower blood pressure, Disp: 90 Tab, Rfl: 3    estradiol (ESTRACE) 2 mg tablet, Take 1 Tab by mouth daily. Indications: VASOMOTOR SYMPTOMS ASSOCIATED WITH MENOPAUSE, Disp: 90 Tab, Rfl: 3    ezetimibe-simvastatin (VYTORIN) 10-20 mg per tablet, Take 1 Tab by mouth daily. To lower cholesterol, Disp: 90 Tab, Rfl: 3    pioglitazone (ACTOS) 30 mg tablet, Take 1 Tab by mouth daily. Indications: type 2 diabetes mellitus, am, Disp: 90 Tab, Rfl: 3    omega-3 acid ethyl esters (LOVAZA) 1 gram capsule, Take 4 Caps by mouth daily (with breakfast). , Disp: 360 Cap, Rfl: 3    oxyCODONE-acetaminophen (PERCOCET 10)  mg per tablet, Take 1 Tab by mouth every six (6) hours as needed for Pain. Max Daily Amount: 4 Tabs. Indications: Pain, Disp: 120 Tab, Rfl: 0    azithromycin (ZITHROMAX) 250 mg tablet, Take two(2) the first day,  Then one per day for 4 more days. , Disp: 6 Tab, Rfl: 0    omeprazole (PRILOSEC) 40 mg capsule, Take 1 Cap by mouth daily. , Disp: 90 Cap, Rfl: 3    gentamicin (GARAMYCIN) 0.3 % ophthalmic solution, Administer 2 Drops to both eyes every four (4) hours. , Disp: 5 mL, Rfl: 0    albuterol (PROVENTIL VENTOLIN) 2.5 mg /3 mL (0.083 %) nebulizer solution, USE 1 VIAL VIA NEBULIZER EVERY 6 HOURS AS NEEDED FOR WHEEZING, Disp: , Rfl: 0    promethazine-codeine (PHENERGAN WITH CODEINE) 6.25-10 mg/5 mL syrup, Take 5 mL by mouth four (4) times daily as needed for Cough. Max Daily Amount: 20 mL., Disp: 240 mL, Rfl: 0    albuterol (PROVENTIL HFA, VENTOLIN HFA, PROAIR HFA) 90 mcg/actuation inhaler, Take 2 Puffs by inhalation every four (4) hours. , Disp: 1 Inhaler, Rfl: 0    ALPRAZolam (XANAX) 0.5 mg tablet, Take 0.5-1 Tabs by mouth three (3) times daily as needed for Anxiety. Max Daily Amount: 1.5 mg., Disp: 60 Tab, Rfl: 2    Armodafinil (NUVIGIL) 150 mg tab, 1.5 tablets daily  Indications: SLEEPINESS DUE TO OBSTRUCTIVE SLEEP APNEA, Disp: 45 Tab, Rfl: 5    diphenoxylate-atropine (LOMOTIL) 2.5-0.025 mg per tablet, Take 1-2 Tabs by mouth four (4) times daily as needed for Diarrhea. Max Daily Amount: 8 Tabs., Disp: 60 Tab, Rfl: 0    metoclopramide HCl (REGLAN) 10 mg tablet, Take 0.5-1 Tabs by mouth Before breakfast, lunch, dinner and at bedtime for 30 days. For nausea, bloating and belching, Disp: 120 Tab, Rfl: 2    resorcinol-sulfur 2-8 % topical cream, Apply  to affected area two (2) times a day., Disp: 1 Tube, Rfl: 11    levocetirizine (XYZAL) 5 mg tablet, Take 1 Tab by mouth daily. , Disp: 90 Tab, Rfl: 3    doxycycline (VIBRAMYCIN) 100 mg capsule, Take 1 Cap by mouth two (2) times a day., Disp: 60 Cap, Rfl: 5    DULoxetine (CYMBALTA) 30 mg capsule, Take 1 Cap by mouth daily. Indications: ANXIETY WITH DEPRESSION, Disp: 90 Cap, Rfl: 3    cholecalciferol, vitamin D3, 2,000 unit tab, Take 2,000 Units by mouth daily. , Disp: , Rfl:     clotrimazole-betamethasone (LOTRISONE) topical cream, Apply  to affected area three (3) times daily. , Disp: , Rfl:     aspirin 81 mg tablet, Take 81 mg by mouth. Last dose 7/15/12  Indications: am, Disp: , Rfl:      Date Last Reviewed:  4/30/2018   Number of Personal Factors/Comorbidities that affect the Plan of Care: 1-2: MODERATE COMPLEXITY   EXAMINATION:   Observation/Orthostatic Postural Assessment:          Rounded shoulders, forward head   Mental Status:          WFL  Palpation:          Decreased movement R facet joints C5-T4. Patient with palpation to occiput, R first rib mobilization, scalenes   Sensation:         WFL  Skin Integrity:          intact  Vision:          WFL  Balance:          Functional     R cervical rotation 80% with minimal pain  L cervical rotation 100% with soreness  L sidebend 30% with pain  R sidebend 70% no pain   Extension full ROM with pain  Flexion no pain full ROM    Upper Extremity:         Strength grossly 4+/5  Functional Mobility:         Gait/Ambulation:  No significant deficits         Transfers:  independent        Bed Mobility:  Pain with mobility in neck and low back. independent        Stairs:  NT        Wheelchair:  NT              Body Structures Involved:  1. Nerves  2. Bones  3. Joints  4. Muscles  5. Ligaments Body Functions Affected:  1. Sensory/Pain  2. Neuromusculoskeletal  3. Movement Related Activities and Participation Affected:  1. Mobility  2. Domestic Life  3. Interpersonal Interactions and Relationships  4.  Community, Social and [place]   Number of elements that affect the Plan of Care: 4+: HIGH COMPLEXITY   CLINICAL PRESENTATION:   Presentation: Stable and uncomplicated: LOW COMPLEXITY   CLINICAL DECISION MAKING:   Outcome Measure: Tool Used: Neck Disability Index (NDI)  Score:  Initial: 22/50  Most Recent: 13/50 (Date: 4/9/18)   Interpretation of Score: The Neck Disability Index is a revised form of the Oswestry Low Back Pain Index and is designed to measure the activities of daily living in person's with neck pain. Each section is scored on a 0-5 scale, 5 representing the greatest disability. The scores of each section are added together for a total score of 50. Medical Necessity:   · Patient is expected to demonstrate progress in range of motion and functional technique to decrease pain and compensatory movements with ADLs and IADL. · Patient demonstrates good rehab potential due to higher previous functional level. Reason for Services/Other Comments:  · Patient continues to require modification of therapeutic interventions to increase complexity of exercises. Use of outcome tool(s) and clinical judgement create a POC that gives a: Questionable prediction of patient's progress: MODERATE COMPLEXITY   TREATMENT:   (In addition to Assessment/Re-Assessment sessions the following treatments were rendered)  Pre Treatment Symptoms: Patient states that she thinks the therapy is helping but this weekend she had a flare-up with her fibromyalgia. Pain level 5/10 today. Patient reports that on Friday she worked all day, cooked, and cleaned house and went to [employer] shopping. Standing activity increases her back pain even when it is not for a long period. Patient states her  is not doing well and can not help her as much as before and her mother is sick and going to have to begin taking Chemo treatments again and she will need to take her to her treatments. Encouraged patient to work on limiting how much she does at a time and in a day and to get help when she can with tasks.      Modalities: (10 minutes) Patient supine with knee wedge in place for ice pack to upper back and neck to help decrease pain. Manual therapy:   (25 minutes) Patient prone for STM with hands to bilateral upper back, scapular borders, upper traps and right more than left neck and occipital areas. Attempted therapeutic bar for STM but patient unable to tolerate. Patient stated her fibromyalgia is making her more tender and sensitive today. Patient then supine for STM to right more than left neck, upper traps, SCM, suboccipital areas with gentle suboccipital release. Therapeutic Exercise: ( 20 minutes):  Exercises per grid below to improve mobility and strength. Required minimal visual and verbal cues to promote proper body alignment, promote proper body posture and promote proper body breathing techniques. Progressed resistance, range, repetitions and complexity of movement as indicated. Date:  3/29/18 Date:  4/9/18 Date:  4/13/18 Date  4/16/18 Date:  4/18/2018 Date  4/27/18 Date  4/30/18   Activity/Exercise Parameters Parameters Parameters       Shoulder extension   Yellow theraband  2 x 10 x      Scapular retraction X 10 reps   2 x 10 reps with yellow band Yellow theraband  2 x 10 reps Yellow theratubing  2 x 10 reps    Shoulder rows  Yellow theraband  2 x 10 reps   Shoulder shrugs X 10 reps  With discomfort in right neck area X 10 reps X 10 reps  X 10 reps B   X 10 reps    Standing self joint mobiliziation with 1 hand behind head and trunk rotation to that side  X 3 reps to each side X 2 reps each side with pain each side       Shoulder cirlces  X 10 reps each direction  2 x 10 reps X 10 reps  X 10 reps X 10 reps   nodding   2 x 5 reps X 5 reps  X 10 reps X 10 reps    ube     4'/4' Level 2  4 minutes forward and 4 minutes backward Level 2  4 minutes forward and 4 minutes backward   Wall slides     10x10\"     Doorway pec stretches     3x30\"  3 x 30 second hold         Treatment/Session Assessment:  Patient tolerated treatment with tenderness and reporting more soreness today and over the weekend. Patient stated she did a lot on Friday and then was in bed a lot of the day on Saturday. Patient with decreased tolerance to manual treatment today but patient reported decrease tenderness and tightness in the right cervical area following treatment today. Patient would continue to benefit from physical therapy to help improve posture and decrease tightness and pain in neck and upper back. · Pain/ Symptoms: Initial:  5/10 Post Session: Patient reports decreased tenderness and soreness following treatment. No number given ·   Compliance with Program/Exercises: Will assess as treatment progresses. · Recommendations/Intent for next treatment session: \"Next visit will focus on advancements to more challenging activities and reduction in assistance provided\".   Total Treatment Duration:  PT Patient Time In/Time Out  Time In: 1515  Time Out: [address], MICHAEL

## 2019-01-16 ENCOUNTER — HOSPITAL ENCOUNTER (OUTPATIENT)
Dept: MAMMOGRAPHY | Age: 54
Discharge: HOME | End: 2019-01-16
Attending: NURSE PRACTITIONER
Payer: COMMERCIAL

## 2019-01-16 DIAGNOSIS — Z12.39 SCREENING BREAST EXAMINATION: ICD-10-CM

## 2019-01-16 PROCEDURE — 77067 SCR MAMMO BI INCL CAD: CPT
